# Patient Record
Sex: FEMALE | Race: OTHER
[De-identification: names, ages, dates, MRNs, and addresses within clinical notes are randomized per-mention and may not be internally consistent; named-entity substitution may affect disease eponyms.]

---

## 2019-01-17 ENCOUNTER — HOSPITAL ENCOUNTER (INPATIENT)
Dept: HOSPITAL 74 - JER | Age: 34
LOS: 7 days | Discharge: ANOTHER HOSPITAL | DRG: 43 | End: 2019-01-24
Attending: INTERNAL MEDICINE | Admitting: INTERNAL MEDICINE
Payer: COMMERCIAL

## 2019-01-17 VITALS — BODY MASS INDEX: 23.3 KG/M2

## 2019-01-17 DIAGNOSIS — M54.16: ICD-10-CM

## 2019-01-17 DIAGNOSIS — G35: Primary | ICD-10-CM

## 2019-01-17 DIAGNOSIS — D72.829: ICD-10-CM

## 2019-01-17 DIAGNOSIS — H46.9: ICD-10-CM

## 2019-01-17 DIAGNOSIS — G44.209: ICD-10-CM

## 2019-01-17 DIAGNOSIS — G81.94: ICD-10-CM

## 2019-01-17 DIAGNOSIS — I95.1: ICD-10-CM

## 2019-01-17 DIAGNOSIS — R09.89: ICD-10-CM

## 2019-01-17 LAB
ALBUMIN SERPL-MCNC: 3.5 G/DL (ref 3.4–5)
ALP SERPL-CCNC: 49 U/L (ref 45–117)
ALT SERPL-CCNC: 33 U/L (ref 13–61)
ANION GAP SERPL CALC-SCNC: 7 MMOL/L (ref 8–16)
AST SERPL-CCNC: 41 U/L (ref 15–37)
BASOPHILS # BLD: 0.2 % (ref 0–2)
BILIRUB SERPL-MCNC: 0.4 MG/DL (ref 0.2–1)
BUN SERPL-MCNC: 22 MG/DL (ref 7–18)
CALCIUM SERPL-MCNC: 8.5 MG/DL (ref 8.5–10.1)
CHLORIDE SERPL-SCNC: 103 MMOL/L (ref 98–107)
CO2 SERPL-SCNC: 27 MMOL/L (ref 21–32)
CREAT SERPL-MCNC: 0.8 MG/DL (ref 0.55–1.3)
DEPRECATED RDW RBC AUTO: 12.8 % (ref 11.6–15.6)
EOSINOPHIL # BLD: 0 % (ref 0–4.5)
GLUCOSE SERPL-MCNC: 134 MG/DL (ref 74–106)
HCT VFR BLD CALC: 37.3 % (ref 32.4–45.2)
HGB BLD-MCNC: 12.8 GM/DL (ref 10.7–15.3)
LYMPHOCYTES # BLD: 17 % (ref 8–40)
MCH RBC QN AUTO: 31.1 PG (ref 25.7–33.7)
MCHC RBC AUTO-ENTMCNC: 34.2 G/DL (ref 32–36)
MCV RBC: 91.1 FL (ref 80–96)
MONOCYTES # BLD AUTO: 5.2 % (ref 3.8–10.2)
NEUTROPHILS # BLD: 77.6 % (ref 42.8–82.8)
PLATELET # BLD AUTO: 247 K/MM3 (ref 134–434)
PMV BLD: 9.6 FL (ref 7.5–11.1)
POTASSIUM SERPLBLD-SCNC: 5 MMOL/L (ref 3.5–5.1)
PROT SERPL-MCNC: 7.5 G/DL (ref 6.4–8.2)
RBC # BLD AUTO: 4.1 M/MM3 (ref 3.6–5.2)
SODIUM SERPL-SCNC: 137 MMOL/L (ref 136–145)
WBC # BLD AUTO: 11 K/MM3 (ref 4–10)

## 2019-01-17 PROCEDURE — G0378 HOSPITAL OBSERVATION PER HR: HCPCS

## 2019-01-17 RX ADMIN — PANTOPRAZOLE SODIUM SCH MG: 40 INJECTION, POWDER, FOR SOLUTION INTRAVENOUS at 20:04

## 2019-01-17 NOTE — PN
Teaching Attending Note


Name of Resident: Jose E Amor





ATTENDING PHYSICIAN STATEMENT





I saw and evaluated the patient.


I reviewed the resident's note and discussed the case with the resident.


I agree with the resident's findings and plan as documented.








SUBJECTIVE: This is a 33 year old woman with a history of MS who comes to the 

ED complaining of blurred vision, headaches, and left leg weakness x 2 weeks. 

She also reports pain with some movements of her left eye. She was recently 

admitted at [facility] and treated with IV steroids. She was discharged home 

on oral steroids. She says that she was not doing better at discharge. Today, 

Dr. Bridges advised her to be admitted since her symptoms still had not 

improved. 








OBJECTIVE:


 Vital Signs











 Period  Temp  Pulse  Resp  BP Sys/James  Pulse Ox


 


 Last 24 Hr  98.4 F  77  18  137/80  100








HEART: S1S2, RRR


LUNGS: Clear


ABDOMEN: Soft, non-tender, non-distended, normal BS


EXTREMITIES: No edema


NEUROLOGICAL: Alert, oriented. Pupils equal, round and reactive but left 

sluggish compared to right. Normal speech. Sensation intact. Strength 5/5 in LUE

/RUE/RLE and 4/5 in LLE. DTRs symmetric.





 Laboratory Tests











  01/17/19 01/17/19 01/17/19





  18:40 18:40 18:40


 


WBC  11.0 H  


 


RBC  4.10  


 


Hgb  12.8  


 


Hct  37.3  


 


MCV  91.1  


 


MCH  31.1  


 


MCHC  34.2  


 


RDW  12.8  


 


Plt Count  247  


 


MPV  9.6  


 


Absolute Neuts (auto)  8.5 H  


 


Neutrophils %  77.6  


 


Lymphocytes %  17.0  


 


Monocytes %  5.2  


 


Eosinophils %  0.0  


 


Basophils %  0.2  


 


Nucleated RBC %  0  


 


Sodium   137 


 


Potassium   5.0 


 


Chloride   103 


 


Carbon Dioxide   27 


 


Anion Gap   7 L 


 


BUN   22 H 


 


Creatinine   0.8 


 


Creat Clearance w eGFR   > 60 


 


Random Glucose   134 H 


 


Calcium   8.5 


 


Total Bilirubin   0.4 


 


AST   41 H 


 


ALT   33 


 


Alkaline Phosphatase   49 


 


Total Protein   7.5 


 


Albumin   3.5 


 


Serum Pregnancy, Qual    Negative








 Home Medications











 Medication  Instructions  Recorded


 


NK [No Known Home Medication]  01/17/19














ASSESSMENT AND PLAN:


This is a 33 year old woman with a history of MS who presented to the ED with 

blurred vision, headaches, and left leg weakness x 2 weeks.





1. Acute exacerbation of multiple sclerosis with optic neuritis


   - SoluMedrol 250 mg IV q6h


   - MRI of brain, C-spine


   - Neurology consult


2. Leukocytosis


   - Likely steroid-induced


   - No evidence of infection

## 2019-01-17 NOTE — PDOC
Attending Attestation





- Resident


Resident Name: MikeyAlex elamel





- ED Attending Attestation


I have performed the following: I have examined & evaluated the patient, The 

case was reviewed & discussed with the resident, I agree w/resident's findings 

& plan, Exceptions are as noted





- Medical Decision Making





01/17/19 18:47








I, Dr. Laura Palumbo, DO, attest that this document has been prepared under 

my direction and personally reviewed by me in its entirety.   I further attest, 

that it accurately reflects all work, treatment, procedures and medical decision

-making performed by me.  


01/17/19 19:32


a/p: 34yo female with hx of optic neuritis and MS who follows with Dr. Bridges 

sent for IV steroids for a MS flair


-L eye pain,  photophobia


-L leg weakness


-had recent hospitalization at [place] who 3 days of steroids, but didn't 

improve


-did not have MRI brain at [place]


-labs, ekg, steroids ordered by E


-will admit to [facility] - PMD in [place]


-Consult to Dr. Bridges


MRI brain without contrast ordered


01/17/19 19:49


resident discussed the case with [facility] who accepts the patient to serivce





<Laura Palumbo - Last Filed: 01/17/19 19:49>





- HPI


HPI: 





01/17/19 21:42


Patient is a 33 year old female with a significant past medical history of M.S, 

who presents to the ED with complaints of eye pain that began x2 weeks ago. 

Patient reports experiencing graudal left eye pain with associated blurred 

vision that has been constant for 2 weeks. She reports being seen and treated 

at [facility] but was discharged without her symptoms being relieved. 

Patient reports coming into the ED for further evaluation after symptoms failed 

to subside over time. 





Denies chest pain, Sob. Denies nausea, vomiting. Denies fevers, chills. Denies 

dysuria, hematuria. Denies constipation, diarrhea. Denies trauma to affected 

area, loss of consciousness. Denies contact with sick individuals, out of state 

travelling. Denies any other symptoms. 





Allergies: None


Social history: No smoking. No alcohol. No illicit drugs. 


Surgical: None


PMD: None


Neurologist: Dr. Bridges








- Physicial Exam


PE: 





01/17/19 21:42


GENERAL: Awake, alert, and fully oriented, in no acute distress


HEAD: No signs of trauma


EYES: +No afferent pupillary defect.. 


PERRLA, EOMI, sclera anicteric, conjunctiva clear


ENT: Auricles normal inspection, hearing grossly normal, nares patent, 

oropharynx clear without exudates. Moist mucosa


NECK: Normal ROM, supple, no lymphadenopathy, JVD, or masses


LUNGS: Breath sounds equal, clear to auscultation bilaterally.  No wheezes, and 

no crackles


HEART: Regular rate and rhythm, normal S1 and S2, no murmurs, rubs or gallops


ABDOMEN: Soft, nontender, normoactive bowel sounds.  No guarding, no rebound.  

No masses


EXTREMITIES: +Left leg weakness. Upper extremity strength 5/5. +Left leg 

flexion 4/5, plantar 4/ 5, dorsal 4/5


Normal range of motion, no edema.  No clubbing or cyanosis. No cords, erythema, 

or tenderness


NEUROLOGICAL: Cranial nerves II through XII grossly intact.  Normal speech, 

normal gait


SKIN: Warm, Dry, normal turgor, no rashes or lesions noted.








<Daniel Sharma - Last Filed: 01/17/19 21:43>

## 2019-01-17 NOTE — PDOC
History of Present Illness





- General


Chief Complaint: Eye Problem


Stated Complaint: PCP ADMIT


Time Seen by Provider: 01/17/19 18:11


History Source: Patient


Exam Limitations: No Limitations





- History of Present Illness


Initial Comments: 


Patient is a 32 y/o F w/ PMHx RRMS on Avonex p/w eye pain and blurry vision x 2 

weeks. Per patient was seen and treated at [place] but was discharged without 

resolution of symptoms. Her regular neurologist is Dr. Bridges. Continues to 

complain of eye pain and blurry vision as well as slightly worsening weakness 

of the LLE. ROS otherwise negative.


01/17/19 19:15








Past History





- Travel


Traveled outside of the country in the last 30 days: No


Close contact w/someone who was outside of country & ill: No





- Past Medical History


Allergies/Adverse Reactions: 


 Allergies











Allergy/AdvReac Type Severity Reaction Status Date / Time


 


No Known Allergies Allergy   Verified 01/17/19 19:07











COPD: No


Dialysis: No


Liver Disease: No


Other medical history: MS





- Immunization History


Immunization Up to Date: No





- Suicide/Smoking/Psychosocial Hx


Smoking History: Never smoked


Have you smoked in the past 12 months: No


Information on smoking cessation initiated: No


Hx Alcohol Use: No


Drug/Substance Use Hx: No


Substance Use Type: Alcohol





**Review of Systems





- Review of Systems


Comments:: 


As per HPI


01/17/19 19:17








*Physical Exam





- Vital Signs


 Last Vital Signs











Temp Pulse Resp BP Pulse Ox


 


 98.4 F   77   18   137/80   100 


 


 01/17/19 18:12  01/17/19 18:12  01/17/19 18:12  01/17/19 18:12  01/17/19 18:12














- Physical Exam


Comments: 


Gen: A&Ox3, NAD


HEENT: EOMI, +APD on left, MMM


CV: RRR no m/r/g


Resp: CTA b/l


Abd: +bs, soft, NT, ND


Extremities: 2+ pulses, wwp, no edema


Neuro: CN II as per HEENT, CN III-XII intact b/l, motor strength 5-/5 in LLE 

otherwise 5/5 throughout, FtN intact b/l, no spasticity, no sensory deficit


Psych: normal mood, normal affect


Skin: warm, dry, normal turgor


01/17/19 19:17








Moderate Sedation





- Procedure Monitoring


Vital Signs: 


Procedure Monitoring Vital Signs











Temperature  98.4 F   01/17/19 18:12


 


Pulse Rate  77   01/17/19 18:12


 


Respiratory Rate  18   01/17/19 18:12


 


Blood Pressure  137/80   01/17/19 18:12


 


O2 Sat by Pulse Oximetry (%)  100   01/17/19 18:12











ED Treatment Course





- LABORATORY


CBC & Chemistry Diagram: 


 01/17/19 18:40





 01/17/19 18:40





- ADDITIONAL ORDERS


Additional order review: 


 Laboratory  Results











  01/17/19





  18:40


 


Serum Pregnancy, Qual  Negative








 











  01/17/19





  18:40


 


RBC  4.10


 


MCV  91.1


 


MCHC  34.2


 


RDW  12.8


 


MPV  9.6


 


Neutrophils %  77.6


 


Lymphocytes %  17.0


 


Monocytes %  5.2


 


Eosinophils %  0.0


 


Basophils %  0.2














- RADIOLOGY


Radiology Studies Ordered: 














 Category Date Time Status


 


 BRAIN MRI W&W/O CONTRAST [MRI] Stat MRI  01/17/19 19:06 Ordered














- Medications


Given in the ED: 


ED Medications














Discontinued Medications














Generic Name Dose Route Start Last Admin





  Trade Name Freq  PRN Reason Stop Dose Admin


 


Methylprednisolone Sodium Succinate  250 mg  01/17/19 18:12  01/17/19 19:06





  Solu-Medrol -  IVPUSH  01/17/19 18:13  250 mg





  ONCE ONE   Administration





     





     





     





     














Medical Decision Making





- Medical Decision Making


Patient p/w acute exacerbation of MS. Dr. Bridges is aware. Consult placed. 

Solumedrol 250q6h per neuro instructions. Brain MRI ordered.


01/17/19 19:20








*DC/Admit/Observation/Transfer


Diagnosis at time of Disposition: 


 Exacerbation of multiple sclerosis








- Discharge Dispostion


Condition at time of disposition: Stable


Decision to Admit order: Yes





- Referrals





- Patient Instructions





- Post Discharge Activity

## 2019-01-17 NOTE — HP
CHIEF COMPLAINT: Blurred vision, LLE weakness





PCP: 





HISTORY OF PRESENT ILLNESS:


The patient is a 32 yo f w/ PMH MS who comes into the ED c/o a 2 week hx of 

blurred vision, headaches and LLE weakness. The patient sought medical 

attention at [facility] and was admitted there for treatment of these complaints. 

Per the patient, she was treated with 3 doses (amount unknown) of IV steroids 

and discharged home with a large amount of PO steroids and instructed to follow 

up. The patient's symptoms had not resolved on discharge. Today, the patient 

saw her neurologist, Dr. Bridges, who referred her for admission upon hearing 

that her symptoms were not improving. Patient also complains of headaches 2/2 

to the blurred vision as well as LLE weakness for the same period of time. 

Patient denies loss of sensation or paralysis. Patient denies slurred speech or 

facial droop. Patient denies Chest pain, SOB, fevers, chills, dysuria. 





ER course was notable for:


(1) Dr. Bridges consulted from the ER; suggests 250mg medrol Q6h and 40mg IV 

protonix daily as well as MRI of brain, and cervical, thoracic and lumbar spine 

w/ w/o contrast. 


(2) Labs unremarkable 





Recent Travel:


none





PAST MEDICAL HISTORY:


see hpi





PAST SURGICAL HISTORY:


none





Social History:


Smoking: never


Alcohol: denies


Drugs: denies





Family History:


Allergies





No Known Allergies Allergy (Verified 01/17/19 19:07)


 








HOME MEDICATIONS:


REVIEW OF SYSTEMS


CONSTITUTIONAL: 


Absent:  fever, chills, diaphoresis, generalized weakness, malaise, loss of 

appetite, weight change


HEENT: 


Absent:  rhinorrhea, nasal congestion, throat pain, throat swelling, difficulty 

swallowing, mouth swelling, ear pain


CARDIOVASCULAR: 


Absent: chest pain, syncope, palpitations, irregular heart rate, lightheadedness

, peripheral edema


RESPIRATORY: 


Absent: cough, shortness of breath, dyspnea with exertion, orthopnea, wheezing, 

stridor, hemoptysis


GASTROINTESTINAL:


Absent: abdominal pain, abdominal distension, nausea, vomiting, diarrhea, 

constipation, melena, hematochezia


GENITOURINARY: 


Absent: dysuria, frequency, urgency, hesitancy, hematuria, flank pain, genital 

pain


MUSCULOSKELETAL: 


Absent: myalgia, arthralgia, joint swelling, back pain, neck pain


SKIN: 


Absent: rash, itching, pallor


HEMATOLOGIC/IMMUNOLOGIC: 


Absent: easy bleeding, easy bruising, lymphadenopathy, frequent infections


ENDOCRINE:


Absent: unexplained weight gain, unexplained weight loss, heat intolerance, 

cold intolerance


NEUROLOGIC: 


Absent: dizziness, unsteady gait, seizure, mental status changes, bladder or 

bowel incontinence


PSYCHIATRIC: 


Absent: anxiety, depression, suicidal or homicidal ideation, hallucinations.








PHYSICAL EXAMINATION


 Vital Signs - 24 hr











  01/17/19





  18:12


 


Temperature 98.4 F


 


Pulse Rate 77


 


Respiratory 18





Rate 


 


Blood Pressure 137/80


 


O2 Sat by Pulse 100





Oximetry (%) 











GENERAL: Awake, alert, and fully oriented, in no acute distress.


HEAD: Normal with no signs of trauma.


EYES: Pupils equal, round. Left pupil sluggish in response to light. Right 

pupil with brisk response to light. Consensual pupillary reaction intact.  

extraocular movements intact. Patient endorses left eye pain upon asked to look 

up and to the right. sclera anicteric, conjunctiva clear. No lid lag.


LUNGS: Breath sounds equal, clear to auscultation bilaterally. No wheezes, and 

no crackles. No accessory muscle use.


HEART: Regular rate and rhythm, normal S1 and S2 without murmur, rub or gallop.


ABDOMEN: Soft, nontender, not distended, normoactive bowel sounds, no guarding, 

no rebound, no masses.  No hepatomegaly or  splenomegaly. 


LOWER EXTREMITIES: 2+ pulses, warm, well-perfused. No calf tenderness. No 

peripheral edema. 


NEUROLOGICAL:  Cranial nerves II-X intact except for eyes noted above. Normal 

speech. Strength 5/5 in both upper extremities, 5/5 in the right lower extremity

, 4/5 in left lower extremity. 


SKIN: Warm, dry, normal turgor, no rashes or lesions noted, normal capillary 

refill. 





 Laboratory Results - last 24 hr











  01/17/19 01/17/19 01/17/19





  18:40 18:40 18:40


 


WBC  11.0 H  


 


RBC  4.10  


 


Hgb  12.8  


 


Hct  37.3  


 


MCV  91.1  


 


MCH  31.1  


 


MCHC  34.2  


 


RDW  12.8  


 


Plt Count  247  


 


MPV  9.6  


 


Absolute Neuts (auto)  8.5 H  


 


Neutrophils %  77.6  


 


Lymphocytes %  17.0  


 


Monocytes %  5.2  


 


Eosinophils %  0.0  


 


Basophils %  0.2  


 


Nucleated RBC %  0  


 


Sodium   137 


 


Potassium   5.0 


 


Chloride   103 


 


Carbon Dioxide   27 


 


Anion Gap   7 L 


 


BUN   22 H 


 


Creatinine   0.8 


 


Creat Clearance w eGFR   > 60 


 


Random Glucose   134 H 


 


Calcium   8.5 


 


Total Bilirubin   0.4 


 


AST   41 H 


 


ALT   33 


 


Alkaline Phosphatase   49 


 


Total Protein   7.5 


 


Albumin   3.5 


 


Serum Pregnancy, Qual    Negative











ASSESSMENT/PLAN:


The patient is a 32 yo f w/ PMH MS who comes into the ED c/o persistent blurred 

vision, headache and LLE weakness. 





#blurred vision and weakness 2/2 to acute exacerbation of MS


   -Dr. Bridges consulted from ER; suggested 250mg medrol IV, 40 mg protonix IV 

daily and MRI of the brain and spine


   -will treat patient according to neurology's instructions 





#FEN


   -no fluids indicated


   -lytes wnl


   -regular diet





#Prophy


   -patient young and ambulatory


   -SCDs, early ambulation





#Dispo


   -admit inpatient med-surg





Visit type





- Emergency Visit


Emergency Visit: Yes


ED Registration Date: 01/17/19


Care time: The patient presented to the Emergency Department on the above date 

and was hospitalized for further evaluation of their emergent condition.





- New Patient


This patient is new to me today: Yes


Date on this admission: 01/17/19





- Critical Care


Critical Care patient: No

## 2019-01-17 NOTE — PDOC
Rapid Medical Evaluation


Time Seen by Provider: 01/17/19 18:11


Medical Evaluation: 


 Allergies











Allergy/AdvReac Type Severity Reaction Status Date / Time


 


No Known Allergies Allergy   Verified 12/10/15 11:00











01/17/19 18:11


I have performed a brief in-person evaluation of this patient.


The patient presents with a chief complaint of: MS exacerbation, sent by Dr. Egan for admission. Pt has rx with further instructions.


Pertinent physical exam findings: Alert, oriented, no distress.


I have ordered the following: EKG, CXR, labs, Solu-Medrol 250mg IVP.


The patient will proceed to the ED for further evaluation.





**Discharge Disposition





- Diagnosis


 Exacerbation of multiple sclerosis








- Referrals





- Patient Instructions





- Post Discharge Activity

## 2019-01-18 LAB
ANION GAP SERPL CALC-SCNC: 7 MMOL/L (ref 8–16)
APTT BLD: 28.3 SECONDS (ref 25.2–36.5)
BUN SERPL-MCNC: 21 MG/DL (ref 7–18)
CALCIUM SERPL-MCNC: 8.3 MG/DL (ref 8.5–10.1)
CHLORIDE SERPL-SCNC: 104 MMOL/L (ref 98–107)
CO2 SERPL-SCNC: 27 MMOL/L (ref 21–32)
CREAT SERPL-MCNC: 0.8 MG/DL (ref 0.55–1.3)
DEPRECATED RDW RBC AUTO: 12.4 % (ref 11.6–15.6)
GLUCOSE SERPL-MCNC: 128 MG/DL (ref 74–106)
HCT VFR BLD CALC: 37.5 % (ref 32.4–45.2)
HGB BLD-MCNC: 12.8 GM/DL (ref 10.7–15.3)
INR BLD: 0.98 (ref 0.83–1.09)
MAGNESIUM SERPL-MCNC: 1.9 MG/DL (ref 1.8–2.4)
MCH RBC QN AUTO: 31.3 PG (ref 25.7–33.7)
MCHC RBC AUTO-ENTMCNC: 34 G/DL (ref 32–36)
MCV RBC: 92.2 FL (ref 80–96)
PHOSPHATE SERPL-MCNC: 3.2 MG/DL (ref 2.5–4.9)
PLATELET # BLD AUTO: 208 K/MM3 (ref 134–434)
PMV BLD: 9.1 FL (ref 7.5–11.1)
POTASSIUM SERPLBLD-SCNC: 4.7 MMOL/L (ref 3.5–5.1)
PT PNL PPP: 11.6 SEC (ref 9.7–13)
RBC # BLD AUTO: 4.07 M/MM3 (ref 3.6–5.2)
SODIUM SERPL-SCNC: 138 MMOL/L (ref 136–145)
WBC # BLD AUTO: 8.6 K/MM3 (ref 4–10)

## 2019-01-18 RX ADMIN — PANTOPRAZOLE SODIUM SCH MG: 40 INJECTION, POWDER, FOR SOLUTION INTRAVENOUS at 09:30

## 2019-01-18 RX ADMIN — ACETAMINOPHEN PRN MG: 325 TABLET ORAL at 09:47

## 2019-01-18 RX ADMIN — INSULIN ASPART SCH: 100 INJECTION, SOLUTION INTRAVENOUS; SUBCUTANEOUS at 11:40

## 2019-01-18 RX ADMIN — DULOXETINE SCH MG: 20 CAPSULE, DELAYED RELEASE ORAL at 10:30

## 2019-01-18 RX ADMIN — INSULIN ASPART SCH: 100 INJECTION, SOLUTION INTRAVENOUS; SUBCUTANEOUS at 18:20

## 2019-01-18 RX ADMIN — BUTALBITAL, ACETAMINOPHEN, AND CAFFEINE PRN TABLET: 50; 325; 40 TABLET ORAL at 10:29

## 2019-01-18 RX ADMIN — ACETAMINOPHEN PRN MG: 325 TABLET ORAL at 00:03

## 2019-01-18 RX ADMIN — METHYLPREDNISOLONE SODIUM SUCCINATE SCH MG: 125 INJECTION, POWDER, FOR SOLUTION INTRAMUSCULAR; INTRAVENOUS at 21:59

## 2019-01-18 RX ADMIN — INSULIN ASPART SCH: 100 INJECTION, SOLUTION INTRAVENOUS; SUBCUTANEOUS at 22:11

## 2019-01-18 RX ADMIN — METHYLPREDNISOLONE SODIUM SUCCINATE SCH MG: 125 INJECTION, POWDER, FOR SOLUTION INTRAMUSCULAR; INTRAVENOUS at 00:19

## 2019-01-18 RX ADMIN — METHYLPREDNISOLONE SODIUM SUCCINATE SCH MG: 125 INJECTION, POWDER, FOR SOLUTION INTRAMUSCULAR; INTRAVENOUS at 07:50

## 2019-01-18 RX ADMIN — METHYLPREDNISOLONE SODIUM SUCCINATE SCH MG: 125 INJECTION, POWDER, FOR SOLUTION INTRAMUSCULAR; INTRAVENOUS at 13:10

## 2019-01-18 RX ADMIN — HEPARIN SODIUM SCH: 5000 INJECTION, SOLUTION INTRAVENOUS; SUBCUTANEOUS at 23:08

## 2019-01-18 NOTE — PN
Physical Exam: 


SUBJECTIVE: Patient seen and examined this AM. She states her blurred vision 

has slightly improved though not much, and that she is still having weakness in 

her left side specifically her left leg. 








OBJECTIVE:





 Vital Signs











 Period  Temp  Pulse  Resp  BP Sys/James  Pulse Ox


 


 Last 24 Hr  98.1 F-98.4 F  58-78  18-18  128-148/67-92  











GENERAL: A&O, no acute distress


EYES: No visual field loss, though blurred vision in the left eye. Left 

pupillary afferent defect.


EARS, NOSE, THROAT: oropharynx clear without exudates. Moist mucous membranes.


LUNGS: CTA b/l, no crackles or wheezes


HEART: Regular rate and rhythm, normal S1 and S2 without murmur


ABDOMEN: Soft, nontender to palpation, normoactive bowel sounds


EXTREMITIES: warm, well-perfused. No peripheral edema. 


NEUROLOGICAL:  Cranial nerves II-XII intact. Normal speech. 4/5 weakness in LLE 


PSYCHIATRIC: Cooperative. Good eye contact. Appropriate mood and affect.

















 Laboratory Results - last 24 hr











  01/17/19 01/17/19 01/17/19





  18:40 18:40 18:40


 


WBC  11.0 H  


 


RBC  4.10  


 


Hgb  12.8  


 


Hct  37.3  


 


MCV  91.1  


 


MCH  31.1  


 


MCHC  34.2  


 


RDW  12.8  


 


Plt Count  247  


 


MPV  9.6  


 


Absolute Neuts (auto)  8.5 H  


 


Neutrophils %  77.6  


 


Lymphocytes %  17.0  


 


Monocytes %  5.2  


 


Eosinophils %  0.0  


 


Basophils %  0.2  


 


Nucleated RBC %  0  


 


PT with INR   


 


INR   


 


PTT (Actin FS)   


 


Sodium   137 


 


Potassium   5.0 


 


Chloride   103 


 


Carbon Dioxide   27 


 


Anion Gap   7 L 


 


BUN   22 H 


 


Creatinine   0.8 


 


Creat Clearance w eGFR   > 60 


 


POC Glucometer   


 


Random Glucose   134 H 


 


Calcium   8.5 


 


Phosphorus   


 


Magnesium   


 


Total Bilirubin   0.4 


 


AST   41 H 


 


ALT   33 


 


Alkaline Phosphatase   49 


 


Total Protein   7.5 


 


Albumin   3.5 


 


Serum Pregnancy, Qual    Negative














  01/18/19 01/18/19 01/18/19





  05:20 05:20 05:20


 


WBC  8.6  


 


RBC  4.07  


 


Hgb  12.8  


 


Hct  37.5  


 


MCV  92.2  


 


MCH  31.3  


 


MCHC  34.0  


 


RDW  12.4  


 


Plt Count  208  


 


MPV  9.1  


 


Absolute Neuts (auto)   


 


Neutrophils %   


 


Lymphocytes %   


 


Monocytes %   


 


Eosinophils %   


 


Basophils %   


 


Nucleated RBC %   


 


PT with INR   11.60 


 


INR   0.98 


 


PTT (Actin FS)   28.3 


 


Sodium    138


 


Potassium    4.7


 


Chloride    104


 


Carbon Dioxide    27


 


Anion Gap    7 L


 


BUN    21 H


 


Creatinine    0.8


 


Creat Clearance w eGFR    > 60


 


POC Glucometer   


 


Random Glucose    128 H


 


Calcium    8.3 L


 


Phosphorus    3.2


 


Magnesium    1.9


 


Total Bilirubin   


 


AST   


 


ALT   


 


Alkaline Phosphatase   


 


Total Protein   


 


Albumin   


 


Serum Pregnancy, Qual   














  01/18/19





  11:35


 


WBC 


 


RBC 


 


Hgb 


 


Hct 


 


MCV 


 


MCH 


 


MCHC 


 


RDW 


 


Plt Count 


 


MPV 


 


Absolute Neuts (auto) 


 


Neutrophils % 


 


Lymphocytes % 


 


Monocytes % 


 


Eosinophils % 


 


Basophils % 


 


Nucleated RBC % 


 


PT with INR 


 


INR 


 


PTT (Actin FS) 


 


Sodium 


 


Potassium 


 


Chloride 


 


Carbon Dioxide 


 


Anion Gap 


 


BUN 


 


Creatinine 


 


Creat Clearance w eGFR 


 


POC Glucometer  102.84821


 


Random Glucose 


 


Calcium 


 


Phosphorus 


 


Magnesium 


 


Total Bilirubin 


 


AST 


 


ALT 


 


Alkaline Phosphatase 


 


Total Protein 


 


Albumin 


 


Serum Pregnancy, Qual 








Active Medications











Generic Name Dose Route Start Last Admin





  Trade Name Freq  PRN Reason Stop Dose Admin


 


Acetaminophen  650 mg  01/17/19 23:49  01/18/19 09:47





  Tylenol -  PO   650 mg





  Q6H PRN   Administration





  PAIN LEVEL 1-5   





     





     





     


 


Acetaminophen/Butalbital/Caffeine  1 tablet  01/18/19 09:27  01/18/19 10:29





  Fioricet -  PO   1 tablet





  DAILY PRN   Administration





  HEADACHE   





     





     





     


 


Duloxetine HCl  20 mg  01/18/19 10:00  01/18/19 10:30





  Cymbalta -  PO   20 mg





  DAILY ZAYNAB   Administration





     





     





     





     


 


Insulin Aspart  1 vial  01/18/19 11:00  01/18/19 11:40





  Novolog Vial Sliding Scale -  SQ   Not Given





  ACHS ZAYNAB   





     





     





  Protocol   





     


 


Methylprednisolone Sodium Succinate  250 mg  01/18/19 01:00  01/18/19 13:10





  Solu-Medrol -  IVPUSH   250 mg





  Q6H ZAYNAB   Administration





     





     





     





     


 


Pantoprazole Sodium  40 mg  01/17/19 19:45  01/18/19 09:30





  Protonix Iv  IVPUSH   40 mg





  DAILY ZAYNAB   Administration





     





     





     





     











ASSESSMENT/PLAN:


34 yo f w/ PMH MS who comes into the ED c/o a 2 week hx of blurred vision, 

headaches and LLE weakness.








Acute Exacerbation of Multiple Sclerosis with Optic Neuritis


-Neurology consult appreciated and case discussed


-SoluMedrol 250 mg IV Q6


-BGMs ACHS with Insulin sliding scale for glycemic control due to high dose 

steroids


-To receive 3 days of steroid and reevaluate, can d/c Sunday if resolved, if 

not continue until Tuesday


-Can receive Fioricet PRN for Headache





Back and left leg pain, likely Lumbar radiculopathy


-MRI lumbar spine pending


-Cymbalta 20 mg PO Daily, can increase to 30 as per neurology if ineffective





DVT Prophylaxis


-Lovenox 40 mg SQ Daily





FEN


-Fluids: none


-Electrolytes: No electrolyte abnormalities, BMP in AM


-Nutrition: Regular Diet





Disposition


Med/Surg





Visit type





- Emergency Visit


Emergency Visit: Yes


ED Registration Date: 01/18/19


Care time: The patient presented to the Emergency Department on the above date 

and was hospitalized for further evaluation of their emergent condition.





- New Patient


This patient is new to me today: Yes


Date on this admission: 01/18/19





- Critical Care


Critical Care patient: No

## 2019-01-18 NOTE — CONSULT
Consult - text type





- Consultation


Consultation Note: 





  Neurology


CHIEF COMPLAINT: Blurred vision, LLE weakness








HISTORY OF PRESENT ILLNESS:


The patient is a 34 yo f w/ PMH MS who comes into the ED c/o a 2 week hx of 

blurred vision, headaches and LLE weakness. The patient sought medical 

attention at [facility] and was admitted there for treatment of these complaints. 

She was treated with 1g of IV solumedrol for three days and discharged home 

with a large amount of PO steroids and instructed to follow up. The patient's 

symptoms had not resolved on discharge and therefore saw me in the office on 1/ 18 and referred her for admission upon hearing that her symptoms persisted. 

Imaging was not done at [facility]. Patient also complains of headaches 2/2 to 

the blurred vision as well as LLE weakness along with radicular pain. Patient 

denies loss of sensation or paralysis. Patient denies slurred speech or facial 

droop. Completed MRI brain and C spine overnight, reviewed, awaiting official 

read. 








Recent Travel:


none





PAST MEDICAL HISTORY:


see hpi





PAST SURGICAL HISTORY:


none





Social History:


Smoking: never


Alcohol: denies


Drugs: denies





Family History:


Allergies





No Known Allergies Allergy (Verified 01/17/19 19:07)


 








HOME MEDICATIONS:


REVIEW OF SYSTEMS


CONSTITUTIONAL: 


Absent:  fever, chills, diaphoresis, generalized weakness, malaise, loss of 

appetite, weight change


HEENT: 


Absent:  rhinorrhea, nasal congestion, throat pain, throat swelling, difficulty 

swallowing, mouth swelling, ear pain


CARDIOVASCULAR: 


Absent: chest pain, syncope, palpitations, irregular heart rate, lightheadedness

, peripheral edema


RESPIRATORY: 


Absent: cough, shortness of breath, dyspnea with exertion, orthopnea, wheezing, 

stridor, hemoptysis


GASTROINTESTINAL:


Absent: abdominal pain, abdominal distension, nausea, vomiting, diarrhea, 

constipation, melena, hematochezia


GENITOURINARY: 


Absent: dysuria, frequency, urgency, hesitancy, hematuria, flank pain, genital 

pain


MUSCULOSKELETAL: 


Absent: myalgia, arthralgia, joint swelling, back pain, neck pain


SKIN: 


Absent: rash, itching, pallor


HEMATOLOGIC/IMMUNOLOGIC: 


Absent: easy bleeding, easy bruising, lymphadenopathy, frequent infections


ENDOCRINE:


Absent: unexplained weight gain, unexplained weight loss, heat intolerance, 

cold intolerance


NEUROLOGIC: 


Absent: dizziness, unsteady gait, seizure, mental status changes, bladder or 

bowel incontinence


PSYCHIATRIC: 


Absent: anxiety, depression, suicidal or homicidal ideation, hallucinations.








PHYSICAL EXAMINATION


  Vital Signs











 Period  Temp  Pulse  Resp  BP Sys/James  Pulse Ox


 


 Last 24 Hr  98.1 F-98.4 F  58-78  18-18  128-137/67-92  














GENERAL: Awake, alert, and fully oriented, in no acute distress.


HEAD: Normal with no signs of trauma.


EYES: Pupils equal, round. Left pupil sluggish in response to light. Right 

pupil with brisk response to light. Consensual pupillary reaction intact.  

extraocular movements intact. Patient endorses left eye pain upon asked to look 

up and to the right. sclera anicteric, conjunctiva clear. No lid lag.


LUNGS: Breath sounds equal, clear to auscultation bilaterally. No wheezes, and 

no crackles. No accessory muscle use.


HEART: Regular rate and rhythm, normal S1 and S2 without murmur, rub or gallop.


ABDOMEN: Soft, nontender, not distended, normoactive bowel sounds, no guarding, 

no rebound, no masses.  No hepatomegaly or  splenomegaly. 


LOWER EXTREMITIES: 2+ pulses, warm, well-perfused. No calf tenderness. No 

peripheral edema. 


NEUROLOGICAL:  Cranial nerves II-X intact except for eyes noted above. Normal 

speech. Strength 5/5 in both upper extremities, 5/5 in the right lower extremity

, 5-/5 in left lower extremity. 


SKIN: Warm, dry, normal turgor, no rashes or lesions noted, normal capillary 

refill. 





 Laboratory Results - last 24 hr











  01/17/19 01/17/19 01/17/19





  18:40 18:40 18:40


 


WBC  11.0 H  


 


RBC  4.10  


 


Hgb  12.8  


 


Hct  37.3  


 


MCV  91.1  


 


MCH  31.1  


 


MCHC  34.2  


 


RDW  12.8  


 


Plt Count  247  


 


MPV  9.6  


 


Absolute Neuts (auto)  8.5 H  


 


Neutrophils %  77.6  


 


Lymphocytes %  17.0  


 


Monocytes %  5.2  


 


Eosinophils %  0.0  


 


Basophils %  0.2  


 


Nucleated RBC %  0  


 


Sodium   137 


 


Potassium   5.0 


 


Chloride   103 


 


Carbon Dioxide   27 


 


Anion Gap   7 L 


 


BUN   22 H 


 


Creatinine   0.8 


 


Creat Clearance w eGFR   > 60 


 


Random Glucose   134 H 


 


Calcium   8.5 


 


Total Bilirubin   0.4 


 


AST   41 H 


 


ALT   33 


 


Alkaline Phosphatase   49 


 


Total Protein   7.5 


 


Albumin   3.5 


 


Serum Pregnancy, Qual    Negative











ASSESSMENT/PLAN:


 34 yo f w/ PMH MS who comes into the ED c/o a 2 week hx of blurred vision, 

headaches and LLE weakness. The patient sought medical attention at [facility] 

and was admitted there for treatment of these complaints. She was treated with 

1g of IV solumedrol for three days and discharged home with a large amount of 

PO steroids and instructed to follow up. The patient's symptoms had not 

resolved on discharge and therefore saw me in the office on 1/18 and referred 

her for admission upon hearing that her symptoms persisted. Imaging was not 

done at [facility]. Patient also complains of headaches 2/2 to the blurred vision 

as well as LLE weakness along with radicular pain. Patient denies loss of 

sensation or paralysis. Patient denies slurred speech or facial droop. 

Completed MRI brain and C spine overnight, reviewed, awaiting official read. 

Follow up official read. She can continue solumedrol [id number] IV, would 

completed a 3 day course on Sunday night. If at that point she is improved, can 

consider discharge. However, if continued visual impairment and/or LLE weakness

, please continue for 5 day course (completed Tuesday evening). Awaiting MRI T 

and L spine. PPI, RISS with steroids. Physical therapy to assist with gait and 

ambulating. Fioricet as needed for headache. Can start duloxetine 20mg for 

symptoms of lumbar radiculopathy for now. Can increase to 30mg if this is not 

effective over the weekend. Discussed with resident at bedside in detail.

## 2019-01-18 NOTE — PN
Teaching Attending Note


Name of Resident: Zaki Camacho





ATTENDING PHYSICIAN STATEMENT





I saw and evaluated the patient.


I reviewed the resident's note and discussed the case with the resident.


I agree with the resident's findings and plan as documented.








SUBJECTIVE: Feels slightly improved. Still complains of some blurring of vision 

and pain/weaknes LLE. Headache improving.








OBJECTIVE: Afebrile, Hemodynamically Stable.


 Last Vital Signs











Temp Pulse Resp BP Pulse Ox


 


 97.9 F   78   20   148/92   99 


 


 01/18/19 14:35  01/18/19 14:35  01/18/19 14:35  01/18/19 14:35  01/18/19 09:48











HEART: S1S2, RRR


LUNGS: Clear to auscultation - no crackles/wheeze.


ABDOMEN: Soft, mild epigastric tenderness - no guarding or rebound.


EXTREMITIES: No edema. No calf tenderness.


NEUROLOGICAL: Alert, oriented. Normal speech. Sensation intact. Strength 5/5 in 

LUE/RUE/RLE and 4/5 in LLE. DTRs symmetric.


 


 Laboratory Results - last 24 hr











  01/17/19 01/17/19 01/17/19





  18:40 18:40 18:40


 


WBC  11.0 H  


 


RBC  4.10  


 


Hgb  12.8  


 


Hct  37.3  


 


MCV  91.1  


 


MCH  31.1  


 


MCHC  34.2  


 


RDW  12.8  


 


Plt Count  247  


 


MPV  9.6  


 


Absolute Neuts (auto)  8.5 H  


 


Neutrophils %  77.6  


 


Lymphocytes %  17.0  


 


Monocytes %  5.2  


 


Eosinophils %  0.0  


 


Basophils %  0.2  


 


Nucleated RBC %  0  


 


PT with INR   


 


INR   


 


PTT (Actin FS)   


 


Sodium   137 


 


Potassium   5.0 


 


Chloride   103 


 


Carbon Dioxide   27 


 


Anion Gap   7 L 


 


BUN   22 H 


 


Creatinine   0.8 


 


Creat Clearance w eGFR   > 60 


 


POC Glucometer   


 


Random Glucose   134 H 


 


Calcium   8.5 


 


Phosphorus   


 


Magnesium   


 


Total Bilirubin   0.4 


 


AST   41 H 


 


ALT   33 


 


Alkaline Phosphatase   49 


 


Total Protein   7.5 


 


Albumin   3.5 


 


Serum Pregnancy, Qual    Negative














  01/18/19 01/18/19 01/18/19





  05:20 05:20 05:20


 


WBC  8.6  


 


RBC  4.07  


 


Hgb  12.8  


 


Hct  37.5  


 


MCV  92.2  


 


MCH  31.3  


 


MCHC  34.0  


 


RDW  12.4  


 


Plt Count  208  


 


MPV  9.1  


 


Absolute Neuts (auto)   


 


Neutrophils %   


 


Lymphocytes %   


 


Monocytes %   


 


Eosinophils %   


 


Basophils %   


 


Nucleated RBC %   


 


PT with INR   11.60 


 


INR   0.98 


 


PTT (Actin FS)   28.3 


 


Sodium    138


 


Potassium    4.7


 


Chloride    104


 


Carbon Dioxide    27


 


Anion Gap    7 L


 


BUN    21 H


 


Creatinine    0.8


 


Creat Clearance w eGFR    > 60


 


POC Glucometer   


 


Random Glucose    128 H


 


Calcium    8.3 L


 


Phosphorus    3.2


 


Magnesium    1.9


 


Total Bilirubin   


 


AST   


 


ALT   


 


Alkaline Phosphatase   


 


Total Protein   


 


Albumin   


 


Serum Pregnancy, Qual   














  01/18/19





  11:35


 


WBC 


 


RBC 


 


Hgb 


 


Hct 


 


MCV 


 


MCH 


 


MCHC 


 


RDW 


 


Plt Count 


 


MPV 


 


Absolute Neuts (auto) 


 


Neutrophils % 


 


Lymphocytes % 


 


Monocytes % 


 


Eosinophils % 


 


Basophils % 


 


Nucleated RBC % 


 


PT with INR 


 


INR 


 


PTT (Actin FS) 


 


Sodium 


 


Potassium 


 


Chloride 


 


Carbon Dioxide 


 


Anion Gap 


 


BUN 


 


Creatinine 


 


Creat Clearance w eGFR 


 


POC Glucometer  102.95389


 


Random Glucose 


 


Calcium 


 


Phosphorus 


 


Magnesium 


 


Total Bilirubin 


 


AST 


 


ALT 


 


Alkaline Phosphatase 


 


Total Protein 


 


Albumin 


 


Serum Pregnancy, Qual 








 Current Medications











Generic Name Dose Route Start Last Admin





  Trade Name Freq  PRN Reason Stop Dose Admin


 


Acetaminophen  650 mg  01/17/19 23:49  01/18/19 09:47





  Tylenol -  PO   650 mg





  Q6H PRN   Administration





  PAIN LEVEL 1-5   





     





     





     


 


Acetaminophen/Butalbital/Caffeine  1 tablet  01/18/19 09:27  01/18/19 10:29





  Fioricet -  PO   1 tablet





  DAILY PRN   Administration





  HEADACHE   





     





     





     


 


Duloxetine HCl  20 mg  01/18/19 10:00  01/18/19 10:30





  Cymbalta -  PO   20 mg





  DAILY ZAYNAB   Administration





     





     





     





     


 


Insulin Aspart  1 vial  01/18/19 11:00  01/18/19 11:40





  Novolog Vial Sliding Scale -  SQ   Not Given





  [facility] ZAYNAB   





     





     





  Protocol   





     


 


Methylprednisolone Sodium Succinate  250 mg  01/18/19 01:00  01/18/19 13:10





  Solu-Medrol -  IVPUSH   250 mg





  Q6H ZAYNAB   Administration





     





     





     





     


 


Pantoprazole Sodium  40 mg  01/17/19 19:45  01/18/19 09:30





  Protonix Iv  IVPUSH   40 mg





  DAILY ZAYNAB   Administration





     





     





     





     

















ASSESSMENT AND PLAN:





33 year old female with a history of MS who presented to the ED with blurred 

vision, headaches, and left leg weakness for he past 2 weeks. She was recently 

admitted at [facility] 1 week ago and treated with IV steroids for 3 days 

then discharged on oral steroid. She is currently referred to the ED from 

Neurology office due to persistence of symptoms.





1. Acute exacerbation of multiple sclerosis with optic neuritis


Started on SoluMedrol 250 mg IV q6h


MRI of brain, C/T/L - spine: Multiple areas of demyelinating lesions including 

L pontine lesion supratentorial lesion, intermedullary plaques C4/5/6/7


Neurology, Dr. Bridges, evaluated  - to continue IV Solumedrol for 3-5 days, 

start Cymbalta, and Fioricet for headache.





2. Leukocytosis


Likely steroid induced, resolved.





GI Px - Protonix


DVT Px - Heparin SQ

## 2019-01-19 LAB
ANION GAP SERPL CALC-SCNC: 7 MMOL/L (ref 8–16)
BUN SERPL-MCNC: 18 MG/DL (ref 7–18)
CALCIUM SERPL-MCNC: 8.3 MG/DL (ref 8.5–10.1)
CHLORIDE SERPL-SCNC: 100 MMOL/L (ref 98–107)
CO2 SERPL-SCNC: 28 MMOL/L (ref 21–32)
CREAT SERPL-MCNC: 0.8 MG/DL (ref 0.55–1.3)
DEPRECATED RDW RBC AUTO: 12.4 % (ref 11.6–15.6)
GLUCOSE SERPL-MCNC: 120 MG/DL (ref 74–106)
HCT VFR BLD CALC: 38.6 % (ref 32.4–45.2)
HGB BLD-MCNC: 13.2 GM/DL (ref 10.7–15.3)
MAGNESIUM SERPL-MCNC: 2.1 MG/DL (ref 1.8–2.4)
MCH RBC QN AUTO: 31.4 PG (ref 25.7–33.7)
MCHC RBC AUTO-ENTMCNC: 34.2 G/DL (ref 32–36)
MCV RBC: 91.8 FL (ref 80–96)
PHOSPHATE SERPL-MCNC: 2.9 MG/DL (ref 2.5–4.9)
PLATELET # BLD AUTO: 261 K/MM3 (ref 134–434)
PMV BLD: 9.6 FL (ref 7.5–11.1)
POTASSIUM SERPLBLD-SCNC: 4.3 MMOL/L (ref 3.5–5.1)
RBC # BLD AUTO: 4.2 M/MM3 (ref 3.6–5.2)
SODIUM SERPL-SCNC: 135 MMOL/L (ref 136–145)
WBC # BLD AUTO: 13.6 K/MM3 (ref 4–10)

## 2019-01-19 RX ADMIN — METHYLPREDNISOLONE SODIUM SUCCINATE SCH MG: 125 INJECTION, POWDER, FOR SOLUTION INTRAMUSCULAR; INTRAVENOUS at 03:09

## 2019-01-19 RX ADMIN — HEPARIN SODIUM SCH: 5000 INJECTION, SOLUTION INTRAVENOUS; SUBCUTANEOUS at 21:04

## 2019-01-19 RX ADMIN — METHYLPREDNISOLONE SODIUM SUCCINATE SCH MG: 125 INJECTION, POWDER, FOR SOLUTION INTRAMUSCULAR; INTRAVENOUS at 21:04

## 2019-01-19 RX ADMIN — INSULIN ASPART SCH: 100 INJECTION, SOLUTION INTRAVENOUS; SUBCUTANEOUS at 23:14

## 2019-01-19 RX ADMIN — METHYLPREDNISOLONE SODIUM SUCCINATE SCH MG: 125 INJECTION, POWDER, FOR SOLUTION INTRAMUSCULAR; INTRAVENOUS at 10:57

## 2019-01-19 RX ADMIN — METHYLPREDNISOLONE SODIUM SUCCINATE SCH MG: 125 INJECTION, POWDER, FOR SOLUTION INTRAMUSCULAR; INTRAVENOUS at 14:58

## 2019-01-19 RX ADMIN — HEPARIN SODIUM SCH UNIT: 5000 INJECTION, SOLUTION INTRAVENOUS; SUBCUTANEOUS at 06:42

## 2019-01-19 RX ADMIN — DULOXETINE SCH MG: 20 CAPSULE, DELAYED RELEASE ORAL at 10:58

## 2019-01-19 RX ADMIN — BUTALBITAL, ACETAMINOPHEN, AND CAFFEINE PRN TABLET: 50; 325; 40 TABLET ORAL at 06:41

## 2019-01-19 RX ADMIN — ONDANSETRON PRN MG: 2 INJECTION INTRAMUSCULAR; INTRAVENOUS at 21:04

## 2019-01-19 RX ADMIN — ACETAMINOPHEN PRN MG: 325 TABLET ORAL at 00:45

## 2019-01-19 RX ADMIN — INSULIN ASPART SCH: 100 INJECTION, SOLUTION INTRAVENOUS; SUBCUTANEOUS at 17:27

## 2019-01-19 RX ADMIN — HEPARIN SODIUM SCH: 5000 INJECTION, SOLUTION INTRAVENOUS; SUBCUTANEOUS at 13:34

## 2019-01-19 RX ADMIN — PANTOPRAZOLE SODIUM SCH MG: 40 TABLET, DELAYED RELEASE ORAL at 10:58

## 2019-01-19 RX ADMIN — ACETAMINOPHEN PRN MG: 325 TABLET ORAL at 14:58

## 2019-01-19 RX ADMIN — INSULIN ASPART SCH: 100 INJECTION, SOLUTION INTRAVENOUS; SUBCUTANEOUS at 06:45

## 2019-01-19 RX ADMIN — INSULIN ASPART SCH: 100 INJECTION, SOLUTION INTRAVENOUS; SUBCUTANEOUS at 11:23

## 2019-01-19 NOTE — PN
Teaching Attending Note


Name of Resident: Zaki Camacho





ATTENDING PHYSICIAN STATEMENT





I saw and evaluated the patient.


I reviewed the resident's note and discussed the case with the resident.


I agree with the resident's findings and plan as documented.








SUBJECTIVE: Feels some improvement in blurred vision but still has pain Left 

eye and LLE. Headache improving.








OBJECTIVE: Afebrile, Hemodynamically Stable.


 Last Vital Signs











Temp Pulse Resp BP Pulse Ox


 


 98.2 F   62   20   150/92   99 


 


 01/19/19 06:00  01/19/19 06:00  01/19/19 06:00  01/19/19 06:00  01/19/19 03:00








HEART: S1, S2, RRR


LUNGS: Clear to auscultation - no crackles/wheeze.


ABDOMEN: Soft, non-tender - no guarding or rebound. Bowel Sounds normal.


EXTREMITIES: No edema. No calf tenderness.


NEUROLOGICAL: Alert, oriented. Normal speech. Sensation intact. Strength 5/5 in 

LUE/RUE/RLE/LLE. DTRs symmetric.








 Laboratory Results - last 24 hr











  01/18/19 01/18/19 01/18/19





  11:35 18:17 22:08


 


WBC   


 


RBC   


 


Hgb   


 


Hct   


 


MCV   


 


MCH   


 


MCHC   


 


RDW   


 


Plt Count   


 


MPV   


 


Sodium   


 


Potassium   


 


Chloride   


 


Carbon Dioxide   


 


Anion Gap   


 


BUN   


 


Creatinine   


 


Creat Clearance w eGFR   


 


POC Glucometer  102.09439  162  140


 


Random Glucose   


 


Calcium   


 


Phosphorus   


 


Magnesium   














  01/19/19 01/19/19 01/19/19





  06:35 06:35 06:44


 


WBC  13.6 H  


 


RBC  4.20  


 


Hgb  13.2  


 


Hct  38.6  


 


MCV  91.8  


 


MCH  31.4  


 


MCHC  34.2  


 


RDW  12.4  


 


Plt Count  261  D  


 


MPV  9.6  


 


Sodium   135 L 


 


Potassium   4.3 


 


Chloride   100 


 


Carbon Dioxide   28 


 


Anion Gap   7 L 


 


BUN   18 


 


Creatinine   0.8 


 


Creat Clearance w eGFR   > 60 


 


POC Glucometer    124


 


Random Glucose   120 H 


 


Calcium   8.3 L 


 


Phosphorus   2.9 


 


Magnesium   2.1 








 


 Current Medications











Generic Name Dose Route Start Last Admin





  Trade Name Freq  PRN Reason Stop Dose Admin


 


Acetaminophen  650 mg  01/17/19 23:49  01/19/19 00:45





  Tylenol -  PO   650 mg





  Q6H PRN   Administration





  PAIN LEVEL 1-5   





     





     





     


 


Acetaminophen/Butalbital/Caffeine  1 tablet  01/18/19 09:27  01/19/19 06:41





  Fioricet -  PO   1 tablet





  DAILY PRN   Administration





  HEADACHE   





     





     





     


 


Duloxetine HCl  20 mg  01/18/19 10:00  01/18/19 10:30





  Cymbalta -  PO   20 mg





  DAILY ZAYNAB   Administration





     





     





     





     


 


Heparin Sodium (Porcine)  5,000 unit  01/18/19 22:00  01/19/19 06:42





  Heparin -  SQ   5,000 unit





  TID ZAYNAB   Administration





     





     





     





     


 


Insulin Aspart  1 vial  01/18/19 11:00  01/19/19 06:45





  Novolog Vial Sliding Scale -  SQ   Not Given





  [facility]   





     





     





  Protocol   





     


 


Methylprednisolone Sodium Succinate  250 mg  01/18/19 21:00  01/19/19 03:09





  Solu-Medrol -  IVPB   250 mg





  Q6H-IV ZAYNAB   Administration





     





     





     





     


 


Pantoprazole Sodium  40 mg  01/19/19 10:00  





  Protonix -  PO   





  DAILY ZAYNAB   





     





     





     





     











 











ASSESSMENT AND PLAN:





33 year old female with a history of MS who presented to the ED with blurred 

vision, headaches, and left leg pain/weakness for he past 2 weeks. She was 

recently admitted at [facility] 1 week ago and treated with IV steroids for 3 

days then discharged on oral steroid. She is currently referred to the ED from 

Neurologist's office due to persistence of symptoms.





1. Acute exacerbation of multiple sclerosis with optic neuritis and LLE pain/

weakness


Continue SoluMedrol 250 mg IV q6h


MRI of brain, C Spine - spine: Multiple areas of demyelinating lesions 

including L pontine lesion supratentorial lesion, intermedullary plaques C4/5/6/

7


MRI T/L spine pending to exclude Musculoskeletal cause for LLE symptoms.


Neurology, Dr. Bridges, evaluated  - to continue IV Solumedrol for 3-5 days, 

started on Cymbalta, and Fioricet for headache.





2. Leukocytosis - steroid induced.





GI Px - Protonix


DVT Px - Heparin SQ

## 2019-01-19 NOTE — PN
Physical Exam: 


SUBJECTIVE: Patient seen and examined this AM. She states that her blurred 

vision is better but that she is still having some weakness throughout, mostly 

in her left leg and left eye pain. 








OBJECTIVE:





 Vital Signs











 Period  Temp  Pulse  Resp  BP Sys/James  Pulse Ox


 


 Last 24 Hr  97.9 F-98.2 F  60-97  20-20  148-154/85-95  99-99











GENERAL: A&O, no acute distress


EYES: No visual field loss, though blurred vision in the left eye. Left 

pupillary afferent defect.


EARS, NOSE, THROAT: oropharynx clear without exudates. Moist mucous membranes.


LUNGS: CTA b/l, no crackles or wheezes


HEART: Regular rate and rhythm, normal S1 and S2 without murmur


ABDOMEN: Soft, nontender to palpation, normoactive bowel sounds


EXTREMITIES: warm, well-perfused. No peripheral edema. 


NEUROLOGICAL:  Cranial nerves II-XII intact. Normal speech. 4/5 weakness in LLE 


PSYCHIATRIC: Cooperative. Good eye contact. Appropriate mood and affect.














 Laboratory Results - last 24 hr











  01/18/19 01/18/19 01/18/19





  11:35 18:17 22:08


 


WBC   


 


RBC   


 


Hgb   


 


Hct   


 


MCV   


 


MCH   


 


MCHC   


 


RDW   


 


Plt Count   


 


MPV   


 


Sodium   


 


Potassium   


 


Chloride   


 


Carbon Dioxide   


 


Anion Gap   


 


BUN   


 


Creatinine   


 


Creat Clearance w eGFR   


 


POC Glucometer  102.33484  162  140


 


Random Glucose   


 


Calcium   


 


Phosphorus   


 


Magnesium   














  01/19/19 01/19/19 01/19/19





  06:35 06:35 06:44


 


WBC  13.6 H  


 


RBC  4.20  


 


Hgb  13.2  


 


Hct  38.6  


 


MCV  91.8  


 


MCH  31.4  


 


MCHC  34.2  


 


RDW  12.4  


 


Plt Count  261  D  


 


MPV  9.6  


 


Sodium   135 L 


 


Potassium   4.3 


 


Chloride   100 


 


Carbon Dioxide   28 


 


Anion Gap   7 L 


 


BUN   18 


 


Creatinine   0.8 


 


Creat Clearance w eGFR   > 60 


 


POC Glucometer    124


 


Random Glucose   120 H 


 


Calcium   8.3 L 


 


Phosphorus   2.9 


 


Magnesium   2.1 








Active Medications











Generic Name Dose Route Start Last Admin





  Trade Name Freq  PRN Reason Stop Dose Admin


 


Acetaminophen  650 mg  01/17/19 23:49  01/19/19 00:45





  Tylenol -  PO   650 mg





  Q6H PRN   Administration





  PAIN LEVEL 1-5   





     





     





     


 


Acetaminophen/Butalbital/Caffeine  1 tablet  01/18/19 09:27  01/19/19 06:41





  Fioricet -  PO   1 tablet





  DAILY PRN   Administration





  HEADACHE   





     





     





     


 


Duloxetine HCl  20 mg  01/18/19 10:00  01/18/19 10:30





  Cymbalta -  PO   20 mg





  DAILY ZAYNAB   Administration





     





     





     





     


 


Heparin Sodium (Porcine)  5,000 unit  01/18/19 22:00  01/19/19 06:42





  Heparin -  SQ   5,000 unit





  TID ZYANAB   Administration





     





     





     





     


 


Insulin Aspart  1 vial  01/18/19 11:00  01/19/19 06:45





  Novolog Vial Sliding Scale -  SQ   Not Given





  [facility] ZAYNAB   





     





     





  Protocol   





     


 


Methylprednisolone Sodium Succinate  250 mg  01/18/19 21:00  01/19/19 03:09





  Solu-Medrol -  IVPB   250 mg





  Q6H-IV ZAYNAB   Administration





     





     





     





     


 


Pantoprazole Sodium  40 mg  01/19/19 10:00  





  Protonix -  PO   





  DAILY ZAYNAB   





     





     





     





     











ASSESSMENT/PLAN:


34 yo f w/ PMH MS who comes into the ED c/o a 2 week hx of blurred vision, 

headaches and LLE weakness.








Acute Exacerbation of Multiple Sclerosis with Optic Neuritis


-Neurology consult appreciated and case discussed


-SoluMedrol 250 mg IV Q6


-BGMs ACHS with Insulin sliding scale for glycemic control due to high dose 

steroids


-To receive 3 days of steroid and reevaluate, can d/c Sunday if resolved, if 

not continue until Tuesday


-Can receive Fioricet PRN for Headache





Back and left leg pain, likely Lumbar radiculopathy


-MRI lumbar spine pending


-Cymbalta 20 mg PO Daily, can increase to 30 as per neurology if ineffective





DVT Prophylaxis


-Lovenox 40 mg SQ Daily





FEN


-Fluids: none


-Electrolytes: No electrolyte abnormalities, BMP in AM


-Nutrition: Regular Diet





Disposition


Med/Surg








Visit type





- Emergency Visit


Emergency Visit: Yes


ED Registration Date: 01/18/19


Care time: The patient presented to the Emergency Department on the above date 

and was hospitalized for further evaluation of their emergent condition.





- New Patient


This patient is new to me today: No





- Critical Care


Critical Care patient: No

## 2019-01-20 RX ADMIN — ACETAMINOPHEN PRN MG: 325 TABLET ORAL at 17:10

## 2019-01-20 RX ADMIN — INSULIN ASPART SCH: 100 INJECTION, SOLUTION INTRAVENOUS; SUBCUTANEOUS at 21:27

## 2019-01-20 RX ADMIN — HEPARIN SODIUM SCH UNIT: 5000 INJECTION, SOLUTION INTRAVENOUS; SUBCUTANEOUS at 21:43

## 2019-01-20 RX ADMIN — HEPARIN SODIUM SCH: 5000 INJECTION, SOLUTION INTRAVENOUS; SUBCUTANEOUS at 07:11

## 2019-01-20 RX ADMIN — METHYLPREDNISOLONE SODIUM SUCCINATE SCH MG: 125 INJECTION, POWDER, FOR SOLUTION INTRAMUSCULAR; INTRAVENOUS at 14:37

## 2019-01-20 RX ADMIN — HEPARIN SODIUM SCH: 5000 INJECTION, SOLUTION INTRAVENOUS; SUBCUTANEOUS at 14:26

## 2019-01-20 RX ADMIN — PANTOPRAZOLE SODIUM SCH MG: 40 TABLET, DELAYED RELEASE ORAL at 11:01

## 2019-01-20 RX ADMIN — DULOXETINE SCH MG: 20 CAPSULE, DELAYED RELEASE ORAL at 11:01

## 2019-01-20 RX ADMIN — HEPARIN SODIUM SCH: 5000 INJECTION, SOLUTION INTRAVENOUS; SUBCUTANEOUS at 21:19

## 2019-01-20 RX ADMIN — METHYLPREDNISOLONE SODIUM SUCCINATE SCH MG: 125 INJECTION, POWDER, FOR SOLUTION INTRAMUSCULAR; INTRAVENOUS at 03:09

## 2019-01-20 RX ADMIN — INSULIN ASPART SCH: 100 INJECTION, SOLUTION INTRAVENOUS; SUBCUTANEOUS at 17:15

## 2019-01-20 RX ADMIN — INSULIN ASPART SCH: 100 INJECTION, SOLUTION INTRAVENOUS; SUBCUTANEOUS at 11:12

## 2019-01-20 RX ADMIN — INSULIN ASPART SCH: 100 INJECTION, SOLUTION INTRAVENOUS; SUBCUTANEOUS at 08:17

## 2019-01-20 RX ADMIN — METHYLPREDNISOLONE SODIUM SUCCINATE SCH MG: 125 INJECTION, POWDER, FOR SOLUTION INTRAMUSCULAR; INTRAVENOUS at 11:01

## 2019-01-20 RX ADMIN — BUTALBITAL, ACETAMINOPHEN, AND CAFFEINE PRN TABLET: 50; 325; 40 TABLET ORAL at 03:09

## 2019-01-20 RX ADMIN — METHYLPREDNISOLONE SODIUM SUCCINATE SCH MG: 125 INJECTION, POWDER, FOR SOLUTION INTRAMUSCULAR; INTRAVENOUS at 21:20

## 2019-01-20 NOTE — PN
Progress Note (short form)





- Note


Progress Note: 





SUBJECTIVE: Feels improvement in blurred vision but still has pain behind Left 

eye which she says is intense. Headache improving.








OBJECTIVE: Afebrile, Hemodynamically Stable.


 Last Vital Signs











Temp Pulse Resp BP Pulse Ox


 


 98.5 F   65   20   155/96   98 


 


 01/19/19 21:13  01/19/19 21:13  01/19/19 21:13  01/19/19 21:13  01/20/19 03:00








 


HEENT - Atraumatic, Normocephalic.


HEART: S1, S2, RRR


LUNGS: Clear to auscultation - no crackles/wheeze.


ABDOMEN: Soft, non-tender - no guarding or rebound. Bowel Sounds normal.


EXTREMITIES: No edema. No calf tenderness.


NEUROLOGICAL: Alert, oriented. Normal speech. EOMI. Sensation intact. Strength 5

/5 in LUE/RUE/RLE/LLE. DTRs symmetric.





 Laboratory Results - last 24 hr











  01/19/19 01/19/19 01/20/19





  11:17 17:22 08:08


 


POC Glucometer  137  171  163














  01/20/19





  11:10


 


POC Glucometer  122








 Current Medications











Generic Name Dose Route Start Last Admin





  Trade Name Freq  PRN Reason Stop Dose Admin


 


Acetaminophen  650 mg  01/17/19 23:49  01/19/19 14:58





  Tylenol -  PO   650 mg





  Q6H PRN   Administration





  PAIN LEVEL 1-5   





     





     





     


 


Acetaminophen/Butalbital/Caffeine  1 tablet  01/18/19 09:27  01/20/19 03:09





  Fioricet -  PO   1 tablet





  DAILY PRN   Administration





  HEADACHE   





     





     





     


 


Duloxetine HCl  20 mg  01/18/19 10:00  01/20/19 11:01





  Cymbalta -  PO   20 mg





  DAILY ZAYNAB   Administration





     





     





     





     


 


Heparin Sodium (Porcine)  5,000 unit  01/18/19 22:00  01/20/19 07:11





  Heparin -  SQ   Not Given





  TID ZAYNAB   





     





     





     





     


 


Insulin Aspart  1 vial  01/18/19 11:00  01/20/19 11:12





  Novolog Vial Sliding Scale -  SQ   Not Given





  [facility]   





     





     





  Protocol   





     


 


Methylprednisolone Sodium Succinate  250 mg  01/18/19 21:00  01/20/19 11:01





  Solu-Medrol -  IVPB   250 mg





  Q6H-IV ZAYNAB   Administration





     





     





     





     


 


Ondansetron HCl  4 mg  01/19/19 19:47  01/19/19 21:04





  Zofran Injection  IVPUSH   4 mg





  Q8H PRN   Administration





  NAUSEA   





     





     





     


 


Pantoprazole Sodium  40 mg  01/19/19 10:00  01/20/19 11:01





  Protonix -  PO   40 mg





  DAILY ZAYNAB   Administration





     





     





     





     

















ASSESSMENT AND PLAN:





33 year old female with a history of MS who presented to the ED with blurred 

vision, headaches, and left leg pain/weakness for the past 2 weeks. She was 

recently admitted at [facility] 1 week ago and treated with IV steroids for 3 

days then discharged on oral steroid. She is currently referred to the ED from 

Neurologist's office due to persistence of symptoms.





1. Acute exacerbation of multiple sclerosis with optic neuritis and LLE pain/

weakness


Still complains of pain behind Left eye, blurriness improved.


Continue SoluMedrol 250 mg IV q6h


MRI of brain, C Spine - spine: Multiple areas of demyelinating lesions 

including L pontine lesion supratentorial lesion, intermedullary plaques C4/5/6/

7


MRI T/L spine - T9/10demyelinating lesions. No disc disease/herniation/spinal 

stenosis/cord compression.


Neurology, Dr. Bridges, evaluated  - to continue IV Solumedrol for 3-5 days; 

started on Cymbalta, and Fioricet for headache.





2. Leukocytosis - steroid induced.





GI Px - Protonix


DVT Px - Heparin SQ














Visit type





- Emergency Visit


Emergency Visit: Yes


ED Registration Date: 01/18/19


Care time: The patient presented to the Emergency Department on the above date 

and was hospitalized for further evaluation of their emergent condition.





- New Patient


This patient is new to me today: No





- Critical Care


Critical Care patient: No





- Discharge Referral


Referred to [facility] No

## 2019-01-21 RX ADMIN — INSULIN ASPART SCH: 100 INJECTION, SOLUTION INTRAVENOUS; SUBCUTANEOUS at 21:18

## 2019-01-21 RX ADMIN — BUTALBITAL, ACETAMINOPHEN, AND CAFFEINE PRN TABLET: 50; 325; 40 TABLET ORAL at 05:49

## 2019-01-21 RX ADMIN — HEPARIN SODIUM SCH UNIT: 5000 INJECTION, SOLUTION INTRAVENOUS; SUBCUTANEOUS at 21:15

## 2019-01-21 RX ADMIN — INSULIN ASPART SCH: 100 INJECTION, SOLUTION INTRAVENOUS; SUBCUTANEOUS at 17:31

## 2019-01-21 RX ADMIN — HEPARIN SODIUM SCH: 5000 INJECTION, SOLUTION INTRAVENOUS; SUBCUTANEOUS at 15:20

## 2019-01-21 RX ADMIN — PANTOPRAZOLE SODIUM SCH MG: 40 TABLET, DELAYED RELEASE ORAL at 10:58

## 2019-01-21 RX ADMIN — ACETAMINOPHEN PRN MG: 325 TABLET ORAL at 11:21

## 2019-01-21 RX ADMIN — HEPARIN SODIUM SCH: 5000 INJECTION, SOLUTION INTRAVENOUS; SUBCUTANEOUS at 05:36

## 2019-01-21 RX ADMIN — METHYLPREDNISOLONE SODIUM SUCCINATE SCH MG: 125 INJECTION, POWDER, FOR SOLUTION INTRAMUSCULAR; INTRAVENOUS at 21:07

## 2019-01-21 RX ADMIN — INSULIN ASPART SCH: 100 INJECTION, SOLUTION INTRAVENOUS; SUBCUTANEOUS at 06:35

## 2019-01-21 RX ADMIN — INSULIN ASPART SCH: 100 INJECTION, SOLUTION INTRAVENOUS; SUBCUTANEOUS at 11:46

## 2019-01-21 RX ADMIN — ACETAMINOPHEN PRN MG: 325 TABLET ORAL at 17:45

## 2019-01-21 RX ADMIN — ONDANSETRON PRN MG: 2 INJECTION INTRAMUSCULAR; INTRAVENOUS at 05:49

## 2019-01-21 RX ADMIN — METHYLPREDNISOLONE SODIUM SUCCINATE SCH MG: 125 INJECTION, POWDER, FOR SOLUTION INTRAMUSCULAR; INTRAVENOUS at 15:19

## 2019-01-21 RX ADMIN — INSULIN ASPART SCH: 100 INJECTION, SOLUTION INTRAVENOUS; SUBCUTANEOUS at 11:58

## 2019-01-21 RX ADMIN — ONDANSETRON PRN MG: 2 INJECTION INTRAMUSCULAR; INTRAVENOUS at 17:28

## 2019-01-21 RX ADMIN — DULOXETINE SCH MG: 20 CAPSULE, DELAYED RELEASE ORAL at 10:57

## 2019-01-21 RX ADMIN — METHYLPREDNISOLONE SODIUM SUCCINATE SCH MG: 125 INJECTION, POWDER, FOR SOLUTION INTRAMUSCULAR; INTRAVENOUS at 10:57

## 2019-01-21 RX ADMIN — METHYLPREDNISOLONE SODIUM SUCCINATE SCH MG: 125 INJECTION, POWDER, FOR SOLUTION INTRAMUSCULAR; INTRAVENOUS at 03:18

## 2019-01-21 RX ADMIN — POLYETHYLENE GLYCOL 3350 SCH GM: 17 POWDER, FOR SOLUTION ORAL at 15:19

## 2019-01-21 NOTE — CON.CARD
Consult


Consult Specialty:: Cardiology


Referred by:: Hospitalist


Reason for Consultation:: Cardiac evaluation





- History of Present Illness


Chief Complaint: Admitted with MS and optic neuritis


History of Present Illness: 








Patient is a 33 year old female with underlying history of MS who comes in with 

history of blurred vision, headache and both upper and lower extremity 

weakness. She was recently hospitalized at [facility] for treatment of optic neuritis and presented with above symptoms. She 

was given large amount of steroids and after being discharged, she was followed 

up with Dr. Bridges. She was referred for admission as per Dr. Bridges for 

further treatment in hospital. She was given NS bolus for orthostasis and 

currently has resting BP of 160-180 systolic. Standing BP is 114 systolic with 

HR of 117 bpm. She denies chest pain, SOB or palpitations. She denies 

paroxysmal nocturnal dyspnea or orthopnea. She denies fever or chills. She 

denies nausea, vomiting, diarrhea or abdominal pain. She complains of 

intermittent headach and still has blurred vision and weakness.





- History Source


History Provided By: Patient, Medical Record


Limitations to Obtaining History: No Limitations





- Past Medical History


CNS: Yes: Other (Multiple sclerosis)


...LMP: 01/02/19


...Pregnant: No





- Past Surgical History


Past Surgical History: Yes: None





- Alcohol/Substance Use


Hx Alcohol Use: No





- Smoking History


Smoking history: Never smoked


Have you smoked in the past 12 months: No





Home Medications





- Allergies


Allergies/Adverse Reactions: 


 Allergies











Allergy/AdvReac Type Severity Reaction Status Date / Time


 


No Known Allergies Allergy   Verified 01/17/19 19:07














- Home Medications


Home Medications: 


Ambulatory Orders





Unobtainable  01/17/19 











Family Disease History





- Family Disease History


Family Disease History: Other: Grandparent (stroke and ppm)


Other Family History: History of HTN





Review of Systems





- Review of Systems


Constitutional: denies: Chills, Fever


Cardiovascular: reports: Chest Pain.  denies: Palpitations, Shortness of Breath


Respiratory: denies: Cough, Hemoptysis, SOB, SOB on Exertion


Gastrointestinal: denies: Abdominal Pain, Constipation, Diarrhea, Melena, Nausea

, Rectal Bleeding, Vomiting


Musculoskeletal: denies: Joint Pain


Neurological: reports: Headache, Weakness.  denies: Dizziness, Seizure, Syncope


Vital Signs: 


 Vital Signs











Temperature  98 F   01/21/19 15:37


 


Pulse Rate  104 H  01/21/19 15:39


 


Respiratory Rate  18   01/21/19 13:00


 


Blood Pressure  127/90   01/21/19 15:39


 


O2 Sat by Pulse Oximetry (%)  97   01/21/19 10:00











Eyes: Yes: PERRL


HENT: Yes: Atraumatic


Neck: Yes: Supple


Respiratory: Yes: CTA Bilaterally


Gastrointestinal: Yes: Normal Bowel Sounds, Soft.  No: Tenderness


Cardiovascular: Yes: Regular Rate and Rhythm


JVD: No


Carotid Bruit: No


PMI: Non-Displaced


Heart Sounds: Yes: S1, S2


Murmur: No: Systolic Murmur, Diastolic Murmur


Edema: No





- Other Data


Labs, Other Data: 


 CBC, BMP





 01/19/19 06:35 





 01/19/19 06:35 





 INR, PTT











INR  0.98  (0.83-1.09)   01/18/19  05:20    

















Pending


Echo: Pending





Imaging





- Results


MRI: Report Reviewed (Brain MRI: supratentorial demyelination Thoracic and 

lumbar MRI: demyelinating plaques)





Problem List





- Problems


(1) Elevated blood pressure reading


Code(s): R03.0 - ELEVATED BLOOD-PRESSURE READING, W/O DIAGNOSIS OF HTN   





(2) Orthostasis


Code(s): I95.1 - ORTHOSTATIC HYPOTENSION   





(3) Exacerbation of multiple sclerosis


Code(s): G35 - MULTIPLE SCLEROSIS   





(4) Optic neuritis due to multiple sclerosis


Code(s): H46.9 - UNSPECIFIED OPTIC NEURITIS; G35 - MULTIPLE SCLEROSIS   





Assessment/Plan





1. Fluctuating blood pressure with periods of HTN and with orthostasis


2. Acute MS with optic neuritis and both upper and lower extremity weakness


3. Elevated WBC





PLAN:


1. Avoid excessive fluid boluses


2. Monitor BP closely which can be elevated due to steroids and the orthostasis 

possibly autonomic. Patient does not appear dehydrated


3. Agree with echocardiography to assess LV/RV and valvular function


4. ECG





Further plans are to follow


Collin Marquez MD

## 2019-01-21 NOTE — PN
Progress Note, Physician


Chief Complaint: 


MS exacerbation





History of Present Illness: 


Notified today of patient that is in the hospital for MS exacerbation, and 

asked by Dr Calloway to f/u patient who was followed by Dr. Bridges, as I am 

covering him.  This si a 33 year old woman who has MS since 2007, initially 

diagnosed with episode of optic neuritis at [facility].  She has had 

recurrent episodes of optic neuritis as well, primarily in the left eye, as 

well as pain in the legs.  She was on Avonex, then Copaxone, and Rebif, but didn

't like the latter so went back to Avonex.  She transferred her care to Dr. Bridges, she says because she didn't like that at [place] she kept getting 

different doctors and she wanted one MD who knew her.  Recently Dr. Bridges 

tried to have her initiate Tecfidera, but she stopped after a week, because she 

found that the flushing and abdominal discomfort were more than she could bear, 

even for a short time.  She then went back to Avonex about a month ago.  

Coincident with her first dose starting back on the Avonex, she noticed 

blurring of her right eye, which normally doesn't get involved with optic 

neuritis.  Two weeks later her left eye then became blurred again, severe 

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 

.  She presented to the [facility] ER and was admitted for 3 days of IV 

steroids followed by an outpatient taper.  She wasn't better so then went to 

's office and he readmitted her for a 5 day IV steroid course, and 

while she is better than she had been, she is not back to baseline.  








- Current Medication List


Current Medications: 


Active Medications





Acetaminophen (Tylenol -)  650 mg PO Q6H PRN


   PRN Reason: PAIN LEVEL 1-5


   Last Admin: 01/21/19 11:21 Dose:  650 mg


Acetaminophen/Butalbital/Caffeine (Fioricet -)  1 tablet PO DAILY PRN


   PRN Reason: HEADACHE


   Last Admin: 01/21/19 05:49 Dose:  1 tablet


Duloxetine HCl (Cymbalta -)  20 mg PO DAILY [facility]


   Last Admin: 01/21/19 10:57 Dose:  20 mg


Heparin Sodium (Porcine) (Heparin -)  5,000 unit SQ TID [facility]


   Last Admin: 01/21/19 05:36 Dose:  Not Given


Insulin Aspart (Novolog Vial Sliding Scale -)  1 vial SQ ACHS [facility]; Protocol


   Last Admin: 01/21/19 11:58 Dose:  Not Given


Methylprednisolone Sodium Succinate (Solu-Medrol -)  250 mg IVPB Q6H-IV ZAYNAB


   Last Admin: 01/21/19 10:57 Dose:  250 mg


Ondansetron HCl (Zofran Injection)  4 mg IVPUSH Q8H PRN


   PRN Reason: NAUSEA


   Last Admin: 01/21/19 05:49 Dose:  4 mg


Pantoprazole Sodium (Protonix -)  40 mg PO DAILY [facility]


   Last Admin: 01/21/19 10:58 Dose:  40 mg











- Objective


Vital Signs: 


 Vital Signs











Temperature  98.6 F   01/21/19 13:00


 


Pulse Rate  104 H  01/21/19 13:00


 


Respiratory Rate  18   01/21/19 13:00


 


Blood Pressure  185/104 H  01/21/19 13:00


 


O2 Sat by Pulse Oximetry (%)  96   01/20/19 11:00











Neurological: Yes: Alert, Oriented, Babinski negative, Cran Nerves II-XII 

Intact (except for reduced subjective acuity in the left eye.  Red desaturation 

in the left eye.), Other (No pronator drift.  Full motor strength.  Sensation 

OK.)


Labs: 


 CBC, BMP





 01/19/19 06:35 





 01/19/19 06:35 





 INR, PTT











INR  0.98  (0.83-1.09)   01/18/19  05:20    














- ....Imaging


MRI: Report Reviewed, Image Reviewed (Brain several lesions, none with 

enhancement.  No enhancement reported. Looking for it, I can possibly see some 

enhancement faintly along the left optic nerve, but its hard to be certain on 

this screen.  T spine and C spine  and L spine reviewed.   These were done 

without contrast and the t and c spine showed demyelinating plaques but its 

impossible to tell whether these were acute or not.)





Problem List





- Problems


(1) Exacerbation of multiple sclerosis


Code(s): G35 - MULTIPLE SCLEROSIS   





(2) Optic neuritis due to multiple sclerosis


Code(s): H46.9 - UNSPECIFIED OPTIC NEURITIS; G35 - MULTIPLE SCLEROSIS   





Assessment/Plan


Improving optic neuritis though not back to baseline.  Would continue through 

day 5 of Solumedrol.  Recommend ophthalmology evaluation, though not certain 

that that needs to occur in house.  I'll let Dr. Bridges know that I saw her.


Thanks.

## 2019-01-21 NOTE — PN
Progress Note (short form)





- Note


Progress Note: 





SUBJECTIVE: Feels improvement in blurred vision but still has pain behind Left 

eye. Ongoing LLE pain. Lightheadedness on ambulation this AM








OBJECTIVE: Afebrile, Hemodynamically Stable.


 Last Vital Signs











Temp Pulse Resp BP Pulse Ox


 


 98.6 F   108 H  18   180/103 H  96 


 


 01/21/19 13:00  01/21/19 13:30  01/21/19 13:00  01/21/19 13:30  01/20/19 11:00











 


HEENT - Atraumatic, Normocephalic.


HEART: S1, S2, RRR


LUNGS: Clear to auscultation - no crackles/wheeze.


ABDOMEN: Soft, non-tender - no guarding or rebound. Bowel Sounds normal.


EXTREMITIES: No edema. No calf tenderness.


NEUROLOGICAL: AAO x 3. Normal speech. EOMI. Sensation intact. Strength 5/5 in 

LUE/RUE/RLE/LLE. DTRs symmetric.


 


 Laboratory Results - last 24 hr











  01/20/19 01/20/19 01/21/19





  17:13 21:26 05:55


 


POC Glucometer  205  150  124














  01/21/19





  11:56


 


POC Glucometer  122








 Current Medications











Generic Name Dose Route Start Last Admin





  Trade Name Freq  PRN Reason Stop Dose Admin


 


Acetaminophen  650 mg  01/17/19 23:49  01/21/19 11:21





  Tylenol -  PO   650 mg





  Q6H PRN   Administration





  PAIN LEVEL 1-5   





     





     





     


 


Acetaminophen/Butalbital/Caffeine  1 tablet  01/18/19 09:27  01/21/19 05:49





  Fioricet -  PO   1 tablet





  DAILY PRN   Administration





  HEADACHE   





     





     





     


 


Duloxetine HCl  20 mg  01/18/19 10:00  01/21/19 10:57





  Cymbalta -  PO   20 mg





  DAILY ZAYNAB   Administration





     





     





     





     


 


Heparin Sodium (Porcine)  5,000 unit  01/18/19 22:00  01/21/19 05:36





  Heparin -  SQ   Not Given





  TID [facility]   





     





     





     





     


 


Insulin Aspart  1 vial  01/18/19 11:00  01/21/19 11:58





  Novolog Vial Sliding Scale -  SQ   Not Given





  ACHS [facility]   





     





     





  Protocol   





     


 


Methylprednisolone Sodium Succinate  250 mg  01/18/19 21:00  01/21/19 10:57





  Solu-Medrol -  IVPB   250 mg





  Q6H-IV ZAYNAB   Administration





     





     





     





     


 


Ondansetron HCl  4 mg  01/19/19 19:47  01/21/19 05:49





  Zofran Injection  IVPUSH   4 mg





  Q8H PRN   Administration





  NAUSEA   





     





     





     


 


Pantoprazole Sodium  40 mg  01/19/19 10:00  01/21/19 10:58





  Protonix -  PO   40 mg





  DAILY ZAYNAB   Administration





     





     





     





     














 


ASSESSMENT AND PLAN:





33 year old female with a history of MS who presented to the ED with blurred 

vision, headaches, and left leg pain/weakness for the past 2 weeks. She was 

recently admitted at [facility] 1 week ago and treated with IV steroids for 3 

days then discharged on oral steroid. She was referred to the ED from 

Neurologist Dr. Bridges's office due to persistence of symptoms.





1. Acute exacerbation of multiple sclerosis with optic neuritis and LLE pain/

weakness


Still complains of pain behind Left eye, blurriness improved.


MRI of brain, C Spine - spine: Multiple areas of demyelinating lesions 

including L pontine lesion supratentorial lesion, intermedullary plaques C4/5/6/

7


MRI T/L spine - T9/10demyelinating lesions. No disc disease/herniation/spinal 

stenosis/cord compression.


Neurology, Dr. Bridges, evaluated  - to continue IV Solumedrol for 5 days. Also 

started on Cymbalta and Fioricet for headache.


Ophthalmology eval as out-patient as per Neurology.





2. Leukocytosis - steroid induced.





3. Orthostasis with Lightheadedness on ambulation. Will request Echo.





GI Px - Protonix (while on IV Steroid)


DVT Px - Heparin SQ














Visit type





- Emergency Visit


Emergency Visit: Yes


ED Registration Date: 01/18/19


Care time: The patient presented to the Emergency Department on the above date 

and was hospitalized for further evaluation of their emergent condition.





- New Patient


This patient is new to me today: No





- Critical Care


Critical Care patient: No





- Discharge Referral


Referred to [facility] No

## 2019-01-22 RX ADMIN — INSULIN ASPART SCH: 100 INJECTION, SOLUTION INTRAVENOUS; SUBCUTANEOUS at 17:14

## 2019-01-22 RX ADMIN — METHYLPREDNISOLONE SODIUM SUCCINATE SCH MG: 125 INJECTION, POWDER, FOR SOLUTION INTRAMUSCULAR; INTRAVENOUS at 15:17

## 2019-01-22 RX ADMIN — HEPARIN SODIUM SCH: 5000 INJECTION, SOLUTION INTRAVENOUS; SUBCUTANEOUS at 15:17

## 2019-01-22 RX ADMIN — INSULIN ASPART SCH: 100 INJECTION, SOLUTION INTRAVENOUS; SUBCUTANEOUS at 12:39

## 2019-01-22 RX ADMIN — METHYLPREDNISOLONE SODIUM SUCCINATE SCH MG: 125 INJECTION, POWDER, FOR SOLUTION INTRAMUSCULAR; INTRAVENOUS at 09:25

## 2019-01-22 RX ADMIN — HEPARIN SODIUM SCH: 5000 INJECTION, SOLUTION INTRAVENOUS; SUBCUTANEOUS at 05:27

## 2019-01-22 RX ADMIN — POLYETHYLENE GLYCOL 3350 SCH GM: 17 POWDER, FOR SOLUTION ORAL at 09:27

## 2019-01-22 RX ADMIN — DULOXETINE SCH MG: 20 CAPSULE, DELAYED RELEASE ORAL at 09:27

## 2019-01-22 RX ADMIN — INSULIN ASPART SCH: 100 INJECTION, SOLUTION INTRAVENOUS; SUBCUTANEOUS at 21:33

## 2019-01-22 RX ADMIN — METHYLPREDNISOLONE SODIUM SUCCINATE SCH MG: 125 INJECTION, POWDER, FOR SOLUTION INTRAMUSCULAR; INTRAVENOUS at 20:47

## 2019-01-22 RX ADMIN — HEPARIN SODIUM SCH: 5000 INJECTION, SOLUTION INTRAVENOUS; SUBCUTANEOUS at 21:33

## 2019-01-22 RX ADMIN — METHYLPREDNISOLONE SODIUM SUCCINATE SCH MG: 125 INJECTION, POWDER, FOR SOLUTION INTRAMUSCULAR; INTRAVENOUS at 02:53

## 2019-01-22 RX ADMIN — INSULIN ASPART SCH: 100 INJECTION, SOLUTION INTRAVENOUS; SUBCUTANEOUS at 06:26

## 2019-01-22 RX ADMIN — PANTOPRAZOLE SODIUM SCH MG: 40 TABLET, DELAYED RELEASE ORAL at 09:25

## 2019-01-22 RX ADMIN — BUTALBITAL, ACETAMINOPHEN, AND CAFFEINE PRN TABLET: 50; 325; 40 TABLET ORAL at 05:34

## 2019-01-22 NOTE — PN
Progress Note (short form)





- Note


Progress Note: 











                  Neurology








HISTORY OF PRESENT ILLNESS:


The patient is a 34 yo f w/ PMH MS who comes into the ED c/o a 2 week hx of 

blurred vision, headaches and LLE weakness. The patient sought medical 

attention at [facility] and was admitted there for treatment of these complaints. 

She was treated with 1g of IV solumedrol for three days and discharged home 

with a large amount of PO steroids and instructed to follow up. The patient's 

symptoms had not resolved on discharge and therefore saw me in the office on 1/ 18 and referred her for admission upon hearing that her symptoms persisted. 

Imaging was not done at [facility]. Patient also complains of headaches 2/2 to 

the blurred vision as well as LLE weakness along with radicular pain. Patient 

denied loss of sensation or paralysis. Patient denies slurred speech or facial 

droop. Completed MRI brain with hypertense L pontine lesion. Supratentorial 

white matter plaques as well. On C Spine MRI, C4 plaque as well as smaller C5, 

C6, C7 plauques noted. T spine MRI completed and with T9 and T10 lesions/

plaques noted. MRI L spine reviewed and without plaques. SHe does not feel she 

has improved despite 5 days course of steroids completed tonight. Reached out 

to [place]  to facilitate transfer to [place] for advanced 

care. Patient provided multiple options but prefers transfer. Discussed with 

nurse and hospitalist, both aware. 








Allergies





No Known Allergies Allergy (Verified 01/17/19 19:07)


 








Active Medications





Acetaminophen (Tylenol -)  650 mg PO Q6H PRN


   PRN Reason: PAIN LEVEL 1-5


   Last Admin: 01/21/19 17:45 Dose:  650 mg


Acetaminophen/Butalbital/Caffeine (Fioricet -)  1 tablet PO DAILY PRN


   PRN Reason: HEADACHE


   Last Admin: 01/22/19 05:34 Dose:  1 tablet


Duloxetine HCl (Cymbalta -)  20 mg PO DAILY [facility]


   Last Admin: 01/21/19 10:57 Dose:  20 mg


Heparin Sodium (Porcine) (Heparin -)  5,000 unit SQ TID [facility]


   Last Admin: 01/22/19 05:27 Dose:  Not Given


Insulin Aspart (Novolog Vial Sliding Scale -)  1 vial SQ ACHS [facility]; Protocol


   Last Admin: 01/22/19 06:26 Dose:  Not Given


Methylprednisolone Sodium Succinate (Solu-Medrol -)  250 mg IVPB Q6H-IV [facility]


   Last Admin: 01/22/19 02:53 Dose:  250 mg


Ondansetron HCl (Zofran Injection)  4 mg IVPUSH Q8H PRN


   PRN Reason: NAUSEA


   Last Admin: 01/21/19 17:28 Dose:  4 mg


Pantoprazole Sodium (Protonix -)  40 mg PO DAILY [facility]


   Last Admin: 01/21/19 10:58 Dose:  40 mg


Polyethylene Glycol (Miralax (For Daily Use) -)  17 gm PO DAILY [facility]


   Last Admin: 01/21/19 15:19 Dose:  17 gm








PHYSICAL EXAMINATION


  


 Vital Signs











 Period  Temp  Pulse  Resp  BP Sys/James  Pulse Ox


 


 Last 24 Hr  97.9 F-98.6 F    18-20  /  97

















GENERAL: Awake, alert, and fully oriented, in no acute distress.


HEAD: Normal with no signs of trauma.


EYES: Pupils equal, round. Left pupil sluggish in response to light. Right 

pupil with brisk response to light. Consensual pupillary reaction intact.  

extraocular movements intact. Patient endorses left eye pain upon asked to look 

up and to the right. sclera anicteric, conjunctiva clear. No lid lag.


LUNGS: Breath sounds equal, clear to auscultation bilaterally. No wheezes, and 

no crackles. No accessory muscle use.


HEART: Regular rate and rhythm, normal S1 and S2 without murmur, rub or gallop.


ABDOMEN: Soft, nontender, not distended, normoactive bowel sounds, no guarding, 

no rebound, no masses.  No hepatomegaly or  splenomegaly. 


LOWER EXTREMITIES: 2+ pulses, warm, well-perfused. No calf tenderness. No 

peripheral edema. 


NEUROLOGICAL:  Cranial nerves II-X intact except for eyes noted above. Normal 

speech. Strength 5/5 in both upper extremities, 5/5 in the right lower extremity

, 5-/5 in left lower extremity. 


SKIN: Warm, dry, normal turgor, no rashes or lesions noted, normal capillary 

refill. 





 CBCD











WBC  13.6 K/mm3 (4.0-10.0)  H  01/19/19  06:35    


 


RBC  4.20 M/mm3 (3.60-5.2)   01/19/19  06:35    


 


Hgb  13.2 GM/dL (10.7-15.3)   01/19/19  06:35    


 


Hct  38.6 % (32.4-45.2)   01/19/19  06:35    


 


MCV  91.8 fl (80-96)   01/19/19  06:35    


 


MCHC  34.2 g/dl (32.0-36.0)   01/19/19  06:35    


 


RDW  12.4 % (11.6-15.6)   01/19/19  06:35    


 


Plt Count  261 K/MM3 (134-434)  D 01/19/19  06:35    


 


MPV  9.6 fl (7.5-11.1)   01/19/19  06:35    








 CMP











Sodium  135 mmol/L (136-145)  L  01/19/19  06:35    


 


Potassium  4.3 mmol/L (3.5-5.1)   01/19/19  06:35    


 


Chloride  100 mmol/L ()   01/19/19  06:35    


 


Carbon Dioxide  28 mmol/L (21-32)   01/19/19  06:35    


 


Anion Gap  7 MMOL/L (8-16)  L  01/19/19  06:35    


 


BUN  18 mg/dL (7-18)   01/19/19  06:35    


 


Creatinine  0.8 mg/dL (0.55-1.3)   01/19/19  06:35    


 


Creat Clearance w eGFR  > 60  (>60)   01/19/19  06:35    


 


Random Glucose  120 mg/dL ()  H  01/19/19  06:35    


 


Calcium  8.3 mg/dL (8.5-10.1)  L  01/19/19  06:35    


 


Total Bilirubin  0.4 mg/dL (0.2-1)   01/17/19  18:40    


 


AST  41 U/L (15-37)  H  01/17/19  18:40    


 


ALT  33 U/L (13-61)   01/17/19  18:40    


 


Alkaline Phosphatase  49 U/L ()   01/17/19  18:40    


 


Total Protein  7.5 g/dl (6.4-8.2)   01/17/19  18:40    


 


Albumin  3.5 g/dl (3.4-5.0)   01/17/19  18:40    











MRI Brain reviewed


MRI C spine reviewed


MRI T spine reviewed


MRI L spine reviewed





ASSESSMENT/PLAN:


 34 yo f w/ PMH MS who comes into the ED c/o a 2 week hx of blurred vision, 

headaches and LLE weakness. The patient sought medical attention at [facility] 

and was admitted there for treatment of these complaints. She was treated with 

1g of IV solumedrol for three days and discharged home with a large amount of 

PO steroids and instructed to follow up. The patient's symptoms had not 

resolved on discharge and therefore saw me in the office on 1/18 and referred 

her for admission upon hearing that her symptoms persisted. Imaging was not 

done at [facility]. Patient also complains of headaches 2/2 to the blurred vision 

as well as LLE weakness along with radicular pain. Patient denied loss of 

sensation or paralysis. Patient denies slurred speech or facial droop. 

Completed MRI brain with hypertense L pontine lesion. Supratentorial white 

matter plaques as well. On C Spine MRI, C4 plaque as well as smaller C5, C6, C7 

plauques noted. T spine MRI completed and with T9 and T10 lesions/plaques 

noted. MRI L spine reviewed and without plaques. SHe does not feel she has 

improved despite 5 days course of steroids to be completed tonight. Reached out 

to [place]  to facilitate transfer to [place] for advanced 

care. Patient provided multiple options but prefers transfer. Discussed with 

nurse and hospitalist, both aware. Remains on duloxetine 20mg for symptoms of 

lumbar radiculopathy for now. Fioricet for tension headaches. Continue steroids 

for now, completion tonight, RISS, PPI, monitor glucose.

## 2019-01-22 NOTE — PN
Physical Exam: 


SUBJECTIVE: Patient seen and examined this AM. She states her weakness has 

improved but is still present and specifically her optic neuritis is still 

present following full course of steroids.








OBJECTIVE:





 Vital Signs











 Period  Temp  Pulse  Resp  BP Sys/James  Pulse Ox


 


 Last 24 Hr  97.1 F-98.3 F    18-20  114-194/  











GENERAL: A&O, no acute distress


EYES: No visual field loss, though blurred vision in the left eye. Left 

pupillary afferent defect


EARS, NOSE, THROAT: oropharynx clear without exudates. Moist mucous membranes.


LUNGS: CTA b/l, no crackles or wheezes


HEART: Regular rate and rhythm, normal S1 and S2 without murmur


ABDOMEN: Soft, nontender to palpation, normoactive bowel sounds


EXTREMITIES: warm, well-perfused. No peripheral edema. 


NEUROLOGICAL:  Cranial nerves II-XII intact. Normal speech. 5/5 strength now in 

LLE 


PSYCHIATRIC: Cooperative. Good eye contact. Appropriate mood and affect.














 Laboratory Results - last 24 hr











  01/21/19 01/21/19 01/22/19





  17:29 21:18 05:36


 


POC Glucometer  150  131  117














  01/22/19





  12:29


 


POC Glucometer  111








Active Medications











Generic Name Dose Route Start Last Admin





  Trade Name Freq  PRN Reason Stop Dose Admin


 


Acetaminophen  650 mg  01/17/19 23:49  01/21/19 17:45





  Tylenol -  PO   650 mg





  Q6H PRN   Administration





  PAIN LEVEL 1-5   





     





     





     


 


Acetaminophen/Butalbital/Caffeine  1 tablet  01/18/19 09:27  01/22/19 05:34





  Fioricet -  PO   1 tablet





  DAILY PRN   Administration





  HEADACHE   





     





     





     


 


Duloxetine HCl  20 mg  01/18/19 10:00  01/22/19 09:27





  Cymbalta -  PO   20 mg





  DAILY ZAYNAB   Administration





     





     





     





     


 


Heparin Sodium (Porcine)  5,000 unit  01/18/19 22:00  01/22/19 15:17





  Heparin -  SQ   Not Given





  TID [facility]   





     





     





     





     


 


Insulin Aspart  1 vial  01/18/19 11:00  01/22/19 12:39





  Novolog Vial Sliding Scale -  SQ   Not Given





  ACHS [facility]   





     





     





  Protocol   





     


 


Methylprednisolone Sodium Succinate  250 mg  01/18/19 21:00  01/22/19 15:17





  Solu-Medrol -  IVPB   250 mg





  Q6H-IV ZAYNAB   Administration





     





     





     





     


 


Ondansetron HCl  4 mg  01/19/19 19:47  01/21/19 17:28





  Zofran Injection  IVPUSH   4 mg





  Q8H PRN   Administration





  NAUSEA   





     





     





     


 


Pantoprazole Sodium  40 mg  01/19/19 10:00  01/22/19 09:25





  Protonix -  PO   40 mg





  DAILY ZAYNAB   Administration





     





     





     





     


 


Polyethylene Glycol  17 gm  01/21/19 14:15  01/22/19 09:27





  Miralax (For Daily Use) -  PO   17 gm





  DAILY ZAYNAB   Administration





     





     





     





     











ASSESSMENT/PLAN:


32 yo f w/ PMH MS who comes into the ED c/o a 2 week hx of blurred vision, 

headaches and LLE weakness.








Acute Exacerbation of Multiple Sclerosis with Optic Neuritis


-Neurology consult appreciated and case discussed


-SoluMedrol 250 mg IV Q6


-BGMs ACHS with Insulin sliding scale for glycemic control due to high dose 

steroids


-Received 5 days of high dose Solumedrol without much improvement


-Can receive Fioricet PRN for Headache


-Options discussed with pt as per neurology, at this time likely best to 

transfer to [facility] for further management. 





Back and left leg pain


-MRI lumbar spine without concerning compression


-Cymbalta 20 mg PO Daily, can increase to 30 as per neurology if ineffective





DVT Prophylaxis


-Lovenox 40 mg SQ Daily





FEN


-Fluids: none


-Electrolytes: No electrolyte abnormalities, BMP in AM


-Nutrition: Regular Diet





Disposition


Med/Surg





Visit type





- Emergency Visit


Emergency Visit: Yes


ED Registration Date: 01/18/19


Care time: The patient presented to the Emergency Department on the above date 

and was hospitalized for further evaluation of their emergent condition.





- New Patient


This patient is new to me today: No





- Critical Care


Critical Care patient: No

## 2019-01-22 NOTE — PN
Teaching Attending Note


Name of Resident: Zaki Camacho





ATTENDING PHYSICIAN STATEMENT





I saw and evaluated the patient.


I reviewed the resident's note and discussed the case with the resident.


I agree with the resident's findings and plan as documented.








SUBJECTIVE: Patient complains of weakness and pain in both legs.








OBJECTIVE:


 Vital Signs











 Period  Temp  Pulse  Resp  BP Sys/James  Pulse Ox


 


 Last 24 Hr  97.1 F-98.3 F    18-20  114-194/  








HEART: S1S2, RRR


LUNGS: Clear


ABDOMEN: Soft, non-tender, non-distended, normal BS


EXTREMITIES: No edema


NEUROLOGICAL: Alert, oriented, left pupil sluggish, visual fields intact, 

strength intact, sensation intact





 Laboratory Results - last 24 hr











  01/21/19 01/21/19 01/22/19





  17:29 21:18 05:36


 


POC Glucometer  150  131  117














  01/22/19





  12:29


 


POC Glucometer  111








 Current Medications











Generic Name Dose Route Start Last Admin





  Trade Name Freq  PRN Reason Stop Dose Admin


 


Acetaminophen  650 mg  01/17/19 23:49  01/21/19 17:45





  Tylenol -  PO   650 mg





  Q6H PRN   Administration





  PAIN LEVEL 1-5   





     





     





     


 


Acetaminophen/Butalbital/Caffeine  1 tablet  01/18/19 09:27  01/22/19 05:34





  Fioricet -  PO   1 tablet





  DAILY PRN   Administration





  HEADACHE   





     





     





     


 


Duloxetine HCl  20 mg  01/18/19 10:00  01/22/19 09:27





  Cymbalta -  PO   20 mg





  DAILY ZAYNAB   Administration





     





     





     





     


 


Heparin Sodium (Porcine)  5,000 unit  01/18/19 22:00  01/22/19 15:17





  Heparin -  SQ   Not Given





  TID [facility]   





     





     





     





     


 


Insulin Aspart  1 vial  01/18/19 11:00  01/22/19 12:39





  Novolog Vial Sliding Scale -  SQ   Not Given





  ACHS [facility]   





     





     





  Protocol   





     


 


Methylprednisolone Sodium Succinate  250 mg  01/18/19 21:00  01/22/19 15:17





  Solu-Medrol -  IVPB   250 mg





  Q6H-IV ZAYNAB   Administration





     





     





     





     


 


Ondansetron HCl  4 mg  01/19/19 19:47  01/21/19 17:28





  Zofran Injection  IVPUSH   4 mg





  Q8H PRN   Administration





  NAUSEA   





     





     





     


 


Pantoprazole Sodium  40 mg  01/19/19 10:00  01/22/19 09:25





  Protonix -  PO   40 mg





  DAILY ZAYNAB   Administration





     





     





     





     


 


Polyethylene Glycol  17 gm  01/21/19 14:15  01/22/19 09:27





  Miralax (For Daily Use) -  PO   17 gm





  DAILY ZAYNAB   Administration





     





     





     





     














ASSESSMENT AND PLAN:


This is a 33 year old woman with a history of MS who presented to the ED with 

blurred vision, headaches, and left leg pain and weakness. 





1. Acute exacerbation of multiple sclerosis with optic neuritis and LLE pain/

weakness


   - Did not respond to SoluMedrol


   - Arrangements being made for transfer to [place]


2. Tension headache


   - Continue Cymbalta, Fioricet as needed


3. Leukocytosis


   - Steroid-induced


4. Orthostasis


   - Echo shows normal LV, LVEF 65%, normal RV, trace TR

## 2019-01-22 NOTE — DS
Physical Exam: 


SUBJECTIVE: Patient seen and examined this AM. states that she still feels weak 

throughout and concerned with her variances in blood pressure. she states that 

she is still having significant pain and some blurred vision on her left side.








OBJECTIVE:





 Vital Signs











 Period  Temp  Pulse  Resp  BP Sys/James  Pulse Ox


 


 Last 24 Hr  97.1 F-99 F    18-20  125-194/  








PHYSICAL EXAM





GENERAL: A&O, no acute distress


EYES: No visual field loss, though blurred vision in the left eye. Left 

pupillary afferent defect


EARS, NOSE, THROAT: oropharynx clear without exudates. Moist mucous membranes.


LUNGS: CTA b/l, no crackles or wheezes


HEART: Regular rate and rhythm, normal S1 and S2 without murmur


ABDOMEN: Soft, nontender to palpation, normoactive bowel sounds


EXTREMITIES: warm, well-perfused. No peripheral edema. 


NEUROLOGICAL:  Cranial nerves II-XII intact. Normal speech. 5/5 strength now in 

LLE 


PSYCHIATRIC: Cooperative. Good eye contact. Appropriate mood and affect.





LABS


 Laboratory Results - last 24 hr











  01/21/19 01/21/19 01/22/19





  17:29 21:18 05:36


 


POC Glucometer  150  131  117














  01/22/19





  12:29


 


POC Glucometer  111











HOSPITAL COURSE:





Date of Admission:01/18/19





Date of Discharge: 01/22/19








HPI on Admission:


The patient is a 32 yo f w/ PMH MS who comes into the ED c/o a 2 week hx of 

blurred vision, headaches and LLE weakness. The patient sought medical 

attention at [facility] and was admitted there for treatment of these complaints. 

Per the patient, she was treated with 3 doses (amount unknown) of IV steroids 

and discharged home with a large amount of PO steroids and instructed to follow 

up. The patient's symptoms had not resolved on discharge. Today, the patient 

saw her neurologist, Dr. Bridges, who referred her for admission upon hearing 

that her symptoms were not improving. Patient also complains of headaches 2/2 

to the blurred vision as well as LLE weakness for the same period of time. 

Patient denies loss of sensation or paralysis. Patient denies slurred speech or 

facial droop. Patient denies Chest pain, SOB, fevers, chills, dysuria. 





ER course was notable for:


(1) Dr. Bridges consulted from the ER; suggests 250mg medrol Q6h and 40mg IV 

protonix daily as well as MRI of brain, and cervical, thoracic and lumbar spine 

w/ w/o contrast. 


(2) Labs unremarkable 








Hospital Course:


Pt was given 5 days SoluMedrol 250 mg IV Q6 as per neurology. MRI thoracic and 

lumbar spine were done in addition to MRI brain and cervical spine. numerous 

plaques noted throughout MRI imaging in brain and spinal cord. Pt initially had 

significant left leg pain and weakness which was worsened upon lying flat, 

though MRI lumbar spine negative for any concerning nerve root impingement or 

cord compression. When pt did not improve after 5 additional days (keeping in 

consideration recent admission last week with 3 days IV steroids) it was 

discussed with neurology and the patient who agreed that transfer to tertiary 

care center was likely best for continued management of this acute exacerbation 

of MS which was not resolved by high dose steroids. 


Minutes to complete discharge: 35





Discharge Summary


Reason For Visit: EXCERBATION OF MULTIPLE SCLEROSIS


Current Active Problems





Elevated blood pressure reading (Acute)


Exacerbation of multiple sclerosis (Acute)


Optic neuritis due to multiple sclerosis (Acute)


Orthostasis (Acute)








Condition: Stable





- Instructions


Diet, Activity, Other Instructions: 


You were admitted for an exacerbation of your MS. You received 5 days of high 

dose steroids and were seen by your neurologist Dr. Bridges. At the end of your 

5 days of treatment, you felt that your symptoms had not adequately improved. 

Dr. Bridges discussed the options and it was decided that you should be 

transferred to [facility] for further treatment. 





All management should be continued.








You should follow up with your neurologist and primary doctor within 1 week 

following discharge from [facility].








Referrals: 


Dago Bridges MD [Staff Physician] - 


Disposition: TRANSFER ACUTE CARE/OTHER HOSP





- Home Medications


Comprehensive Discharge Medication List: 


Ambulatory Orders





Acetaminophen [Tylenol .Regular Strength -] 650 mg PO Q6H PRN  tablet 01/22/19 


Duloxetine HCl [Cymbalta -] 20 mg PO DAILY  capsule. 01/22/19 


Pantoprazole Sodium [Protonix -] 40 mg PO DAILY  tablet.ec 01/22/19 


Polyethylene Glycol 3350 [Miralax 119 gm Btl -] 17 gm PO DAILY  bottle 01/22/19 








This patient is new to me today: No


Emergency Visit: Yes


ED Registration Date: 01/18/19


Care time: The patient presented to the Emergency Department on the above date 

and was hospitalized for further evaluation of their emergent condition.


Critical Care patient: No





- Discharge Referral


Referred to [facility] No

## 2019-01-22 NOTE — EKG
Test Reason : 

Blood Pressure : ***/*** mmHG

Vent. Rate : 076 BPM     Atrial Rate : 076 BPM

   P-R Int : 112 ms          QRS Dur : 088 ms

    QT Int : 360 ms       P-R-T Axes : 047 018 012 degrees

   QTc Int : 405 ms

 

NORMAL SINUS RHYTHM

INCOMPLETE RBBB

NONSPECIFIC ST ABNORMALITY

ABNORMAL ECG

Confirmed by MD EUSEBIO, IRON ([id number]) on 1/22/2019 4:47:08 PM

 

Referred By:             Confirmed By:IRON KAPLAN MD

## 2019-01-22 NOTE — PN
Progress Note, Physician


Chief Complaint: 





Still with blurred vision and weakness


History of Present Illness: 





Patient was seen and examined. Awake and alert. Chart was reviewed


Denies chest pain, SOB or palpitations


Elevated blood pressure intermittently, but now appears better





- Current Medication List


Current Medications: 


Active Medications





Acetaminophen (Tylenol -)  650 mg PO Q6H PRN


   PRN Reason: PAIN LEVEL 1-5


   Last Admin: 01/21/19 17:45 Dose:  650 mg


Acetaminophen/Butalbital/Caffeine (Fioricet -)  1 tablet PO DAILY PRN


   PRN Reason: HEADACHE


   Last Admin: 01/22/19 05:34 Dose:  1 tablet


Duloxetine HCl (Cymbalta -)  20 mg PO DAILY [facility]


   Last Admin: 01/22/19 09:27 Dose:  20 mg


Heparin Sodium (Porcine) (Heparin -)  5,000 unit SQ TID [facility]


   Last Admin: 01/22/19 05:27 Dose:  Not Given


Insulin Aspart (Novolog Vial Sliding Scale -)  1 vial SQ ACHS [facility]; Protocol


   Last Admin: 01/22/19 06:26 Dose:  Not Given


Methylprednisolone Sodium Succinate (Solu-Medrol -)  250 mg IVPB Q6H-IV ZAYNAB


   Last Admin: 01/22/19 09:25 Dose:  250 mg


Ondansetron HCl (Zofran Injection)  4 mg IVPUSH Q8H PRN


   PRN Reason: NAUSEA


   Last Admin: 01/21/19 17:28 Dose:  4 mg


Pantoprazole Sodium (Protonix -)  40 mg PO DAILY [facility]


   Last Admin: 01/22/19 09:25 Dose:  40 mg


Polyethylene Glycol (Miralax (For Daily Use) -)  17 gm PO DAILY [facility]


   Last Admin: 01/22/19 09:27 Dose:  17 gm











- Objective


Vital Signs: 


 Vital Signs











Temperature  97.1 F L  01/22/19 09:00


 


Pulse Rate  84   01/22/19 09:00


 


Respiratory Rate  18   01/22/19 09:00


 


Blood Pressure  129/71   01/22/19 09:00


 


O2 Sat by Pulse Oximetry (%)  97   01/21/19 10:00











Eyes: Yes: PERRL


HENT: Yes: Atraumatic


Neck: Yes: Supple


Cardiovascular: Yes: Regular Rate and Rhythm, S1, S2


Respiratory: Yes: CTA Bilaterally


Gastrointestinal: Yes: Normal Bowel Sounds, Soft.  No: Tenderness


Edema: No


Additional Findings/Remarks: 








- Review of Systems


Constitutional: denies: Chills, Fever


Cardiovascular: reports: Chest Pain.  denies: Palpitations, Shortness of Breath


Respiratory: denies: Cough, Hemoptysis, SOB, SOB on Exertion


Gastrointestinal: denies: Abdominal Pain, Constipation, Diarrhea, Melena, Nausea

, Rectal Bleeding, Vomiting


Musculoskeletal: denies: Joint Pain


Neurological: reports: Headache, Weakness.  denies: Dizziness, Seizure, Syncope











Problem List





- Problems


(1) Elevated blood pressure reading


Code(s): R03.0 - ELEVATED BLOOD-PRESSURE READING, W/O DIAGNOSIS OF HTN   





(2) Orthostasis


Code(s): I95.1 - ORTHOSTATIC HYPOTENSION   





(3) Exacerbation of multiple sclerosis


Code(s): G35 - MULTIPLE SCLEROSIS   





(4) Optic neuritis due to multiple sclerosis


Code(s): H46.9 - UNSPECIFIED OPTIC NEURITIS; G35 - MULTIPLE SCLEROSIS   





Assessment/Plan





1. Fluctuating blood pressure with periods of HTN and with orthostasis


2. Acute MS with optic neuritis and both upper and lower extremity weakness


3. Elevated WBC





PLAN:


1. Avoid excessive fluid boluses


2. Monitor BP closely which can be elevated due to steroids. Monitor for 

orthostasis. If BP remains elevated, may use small dose of calcium channel 

blocker


3. Echocardiography to assess LV/RV and valvular function


4. Plan to transfer to [facility] as per Dr. Bridges





Further plans are to follow


oCllin Marquez MD

## 2019-01-22 NOTE — ECHO
______________________________________________________________________________



Name: ARIADNE HOLDEN                                    Exam:Adult Echocardiogram

MRN: [id number]         Study Date: 01/22/2019 11:07 AM

Age: 33 yrs

______________________________________________________________________________



Reason For Study: ORTHOSTATIC HYPOETNSION DIZZINESS

Height: 63 in        Weight: 132 lb        BSA: 1.6 m2



______________________________________________________________________________



MMode/2D Measurements & Calculations

IVSd: 0.77 cm                                         Ao root diam: 2.4 cm

LVIDd: 3.5 cm                                         LA dimension: 2.9 cm

LVIDs: 2.5 cm

LVPWd: 0.76 cm



_______________________________________________________

EDV(Teich): 50.0 ml

ESV(Teich): 21.7 ml



Doppler Measurements & Calculations

MV E max robert: 58.2 cm/sec                                 PI end-d robert: 74.9 cm/sec

MV A max robert: 55.8 cm/sec

MV E/A: 1.0

MV dec time: 0.22 sec



___________________________________________________________

Med Peak E' Robert: 12.4 cm/sec

Med E/e': 4.7

Lat Peak E' Robert: 13.6 cm/sec

Lat E/e': 4.3





______________________________________________________________________________

Left Ventricle

The left ventricular size, thickness and function are normal. Ejection Fraction = 65. Left Ventricula
r Filling

pattern is normal for age.

Right Ventricle

The right ventricle is normal in size and function.

Atria

Normal left and right atrial size and function.

Mitral Valve

The mitral valve is normal.

Tricuspid Valve

The tricuspid valve is normal. There is trace tricuspid regurgitation.

Aortic Valve

The aortic valve is normal in structure and function.

Pulmonic Valve

The pulmonic valve is not well seen, but is grossly normal.

Great Vessels

The aortic root is normal size. Normal aortic arch, descending and ascending aorta.

Pericardium/Pleura

There is no pericardial effusion.



______________________________________________________________________________



Interpretation Summary

The left ventricular size, thickness and function are normal

Ejection Fraction = 65.

Left Ventricular Filling pattern is normal for age.

The right ventricle is normal in size and function.

Normal left and right atrial size and function.

The mitral valve is normal.

The tricuspid valve is normal.

There is trace tricuspid regurgitation.

The aortic valve is normal in structure and function.

The pulmonic valve is not well seen, but is grossly normal.

The aortic root is normal size.

Normal aortic arch, descending and ascending aorta

There is no pericardial effusion.







Axel Marcial 01/22/2019 02:40 PM

## 2019-01-23 RX ADMIN — METHYLPREDNISOLONE SODIUM SUCCINATE SCH MG: 125 INJECTION, POWDER, FOR SOLUTION INTRAMUSCULAR; INTRAVENOUS at 02:53

## 2019-01-23 RX ADMIN — DULOXETINE SCH MG: 20 CAPSULE, DELAYED RELEASE ORAL at 10:56

## 2019-01-23 RX ADMIN — HEPARIN SODIUM SCH: 5000 INJECTION, SOLUTION INTRAVENOUS; SUBCUTANEOUS at 06:04

## 2019-01-23 RX ADMIN — HEPARIN SODIUM SCH: 5000 INJECTION, SOLUTION INTRAVENOUS; SUBCUTANEOUS at 21:23

## 2019-01-23 RX ADMIN — HEPARIN SODIUM SCH: 5000 INJECTION, SOLUTION INTRAVENOUS; SUBCUTANEOUS at 16:13

## 2019-01-23 RX ADMIN — PANTOPRAZOLE SODIUM SCH MG: 40 TABLET, DELAYED RELEASE ORAL at 10:55

## 2019-01-23 RX ADMIN — INSULIN ASPART SCH: 100 INJECTION, SOLUTION INTRAVENOUS; SUBCUTANEOUS at 06:04

## 2019-01-23 RX ADMIN — POLYETHYLENE GLYCOL 3350 SCH: 17 POWDER, FOR SOLUTION ORAL at 13:19

## 2019-01-23 RX ADMIN — ACETAMINOPHEN PRN MG: 325 TABLET ORAL at 08:46

## 2019-01-23 RX ADMIN — METHYLPREDNISOLONE SODIUM SUCCINATE SCH MG: 125 INJECTION, POWDER, FOR SOLUTION INTRAMUSCULAR; INTRAVENOUS at 08:45

## 2019-01-23 NOTE — PN
Progress Note (short form)





- Note


Progress Note: 











                  Neurology








HISTORY OF PRESENT ILLNESS:


The patient is a 32 yo f w/ PMH MS who comes into the ED c/o a 2 week hx of 

blurred vision, headaches and LLE weakness. The patient sought medical 

attention at [facility] and was admitted there for treatment of these complaints. 

She was treated with 1g of IV solumedrol for three days and discharged home 

with a large amount of PO steroids and instructed to follow up. The patient's 

symptoms had not resolved on discharge and therefore saw me in the office on 1/ 18 and referred her for admission upon hearing that her symptoms persisted. 

Imaging was not done at [facility]. Patient also complains of headaches 2/2 to 

the blurred vision as well as LLE weakness along with radicular pain. Patient 

denied loss of sensation or paralysis. Patient denies slurred speech or facial 

droop. Completed MRI brain with hypertense L pontine lesion. Supratentorial 

white matter plaques as well. On C Spine MRI, C4 plaque as well as smaller C5, 

C6, C7 plauques noted. T spine MRI completed and with T9 and T10 lesions/

plaques noted. MRI L spine reviewed and without plaques. SHe does not feel she 

has improved despite 5 days course of steroids completed tonight. Reached out 

to [facility]  to facilitate transfer to [facility] for advanced 

care. Multiple conversations with transfer center yesterday, patient accepted 

by Dr. Sanchez, but also spoke to Dr. Orellana, MS specialist, who will be 

consulting and managing patient. Possibly for PLEX but will defer to their 

expertisse. Discussed with patient, awaiting bed. 





Allergies





No Known Allergies Allergy (Verified 01/17/19 19:07)


 


Active Medications











Generic Name Dose Route Start Last Admin





  Trade Name Freq  PRN Reason Stop Dose Admin


 


Acetaminophen  650 mg  01/17/19 23:49  01/23/19 08:46





  Tylenol -  PO   650 mg





  Q6H PRN   Administration





  PAIN LEVEL 1-5   





     





     





     


 


Acetaminophen/Butalbital/Caffeine  1 tablet  01/18/19 09:27  01/22/19 05:34





  Fioricet -  PO   1 tablet





  DAILY PRN   Administration





  HEADACHE   





     





     





     


 


Duloxetine HCl  20 mg  01/18/19 10:00  01/22/19 09:27





  Cymbalta -  PO   20 mg





  DAILY ZAYNAB   Administration





     





     





     





     


 


Heparin Sodium (Porcine)  5,000 unit  01/18/19 22:00  01/23/19 06:04





  Heparin -  SQ   Not Given





  TID [facility]   





     





     





     





     


 


Insulin Aspart  1 vial  01/18/19 11:00  01/23/19 06:04





  Novolog Vial Sliding Scale -  SQ   Not Given





  ACHS [facility]   





     





     





  Protocol   





     


 


Methylprednisolone Sodium Succinate  250 mg  01/18/19 21:00  01/23/19 02:53





  Solu-Medrol -  IVPB   250 mg





  Q6H-IV ZAYNAB   Administration





     





     





     





     


 


Ondansetron HCl  4 mg  01/19/19 19:47  01/21/19 17:28





  Zofran Injection  IVPUSH   4 mg





  Q8H PRN   Administration





  NAUSEA   





     





     





     


 


Pantoprazole Sodium  40 mg  01/19/19 10:00  01/22/19 09:25





  Protonix -  PO   40 mg





  DAILY ZAYNAB   Administration





     





     





     





     


 


Polyethylene Glycol  17 gm  01/21/19 14:15  01/22/19 09:27





  Miralax (For Daily Use) -  PO   17 gm





  DAILY ZAYNAB   Administration





     





     





     





     














PHYSICAL EXAMINATION


  


  Vital Signs











 Period  Temp  Pulse  Resp  BP Sys/James  Pulse Ox


 


 Last 24 Hr  97.8 F-99 F    20-20  125-163/  

















GENERAL: Awake, alert, and fully oriented, in no acute distress.


HEAD: Normal with no signs of trauma.


EYES: Pupils equal, round. Left pupil sluggish in response to light. Right 

pupil with brisk response to light. Consensual pupillary reaction intact.  

extraocular movements intact. Patient endorses left eye pain upon asked to look 

up and to the right. sclera anicteric, conjunctiva clear. No lid lag.


LUNGS: Breath sounds equal, clear to auscultation bilaterally. No wheezes, and 

no crackles. No accessory muscle use.


HEART: Regular rate and rhythm, normal S1 and S2 without murmur, rub or gallop.


ABDOMEN: Soft, nontender, not distended, normoactive bowel sounds, no guarding, 

no rebound, no masses.  No hepatomegaly or  splenomegaly. 


LOWER EXTREMITIES: 2+ pulses, warm, well-perfused. No calf tenderness. No 

peripheral edema. 


NEUROLOGICAL:  Cranial nerves II-X intact except for eyes noted above. Normal 

speech. Strength 5/5 in both upper extremities, 5/5 in the right lower extremity

, 5-/5 in left lower extremity. 


SKIN: Warm, dry, normal turgor, no rashes or lesions noted, normal capillary 

refill. 





  CBCD











WBC  13.6 K/mm3 (4.0-10.0)  H  01/19/19  06:35    


 


RBC  4.20 M/mm3 (3.60-5.2)   01/19/19  06:35    


 


Hgb  13.2 GM/dL (10.7-15.3)   01/19/19  06:35    


 


Hct  38.6 % (32.4-45.2)   01/19/19  06:35    


 


MCV  91.8 fl (80-96)   01/19/19  06:35    


 


MCHC  34.2 g/dl (32.0-36.0)   01/19/19  06:35    


 


RDW  12.4 % (11.6-15.6)   01/19/19  06:35    


 


Plt Count  261 K/MM3 (134-434)  D 01/19/19  06:35    


 


MPV  9.6 fl (7.5-11.1)   01/19/19  06:35    








 CMP











Sodium  135 mmol/L (136-145)  L  01/19/19  06:35    


 


Potassium  4.3 mmol/L (3.5-5.1)   01/19/19  06:35    


 


Chloride  100 mmol/L ()   01/19/19  06:35    


 


Carbon Dioxide  28 mmol/L (21-32)   01/19/19  06:35    


 


Anion Gap  7 MMOL/L (8-16)  L  01/19/19  06:35    


 


BUN  18 mg/dL (7-18)   01/19/19  06:35    


 


Creatinine  0.8 mg/dL (0.55-1.3)   01/19/19  06:35    


 


Creat Clearance w eGFR  > 60  (>60)   01/19/19  06:35    


 


Calcium  8.3 mg/dL (8.5-10.1)  L  01/19/19  06:35    


 


Total Bilirubin  0.4 mg/dL (0.2-1)   01/17/19  18:40    


 


AST  41 U/L (15-37)  H  01/17/19  18:40    


 


ALT  33 U/L (13-61)   01/17/19  18:40    


 


Alkaline Phosphatase  49 U/L ()   01/17/19  18:40    


 


Total Protein  7.5 g/dl (6.4-8.2)   01/17/19  18:40    


 


Albumin  3.5 g/dl (3.4-5.0)   01/17/19  18:40    














MRI Brain reviewed


MRI C spine reviewed


MRI T spine reviewed


MRI L spine reviewed





ASSESSMENT/PLAN:


 32 yo f w/ PMH MS who comes into the ED c/o a 2 week hx of blurred vision, 

headaches and LLE weakness. The patient sought medical attention at [facility] 

and was admitted there for treatment of these complaints. She was treated with 

1g of IV solumedrol for three days and discharged home with a large amount of 

PO steroids and instructed to follow up. The patient's symptoms had not 

resolved on discharge and therefore saw me in the office on 1/18 and referred 

her for admission upon hearing that her symptoms persisted. Imaging was not 

done at [facility]. Patient also complains of headaches 2/2 to the blurred vision 

as well as LLE weakness along with radicular pain. Patient denied loss of 

sensation or paralysis. Patient denies slurred speech or facial droop. 

Completed MRI brain with hypertense L pontine lesion. Supratentorial white 

matter plaques as well. On C Spine MRI, C4 plaque as well as smaller C5, C6, C7 

plauques noted. T spine MRI completed and with T9 and T10 lesions/plaques 

noted. MRI L spine reviewed and without plaques. SHe does not feel she has 

improved despite 5 days course of steroids to be completed tonight. Reached out 

to [facility]  to facilitate transfer to [facility] for advanced 

care. Patient provided multiple options but prefers transfer. Discussed with 

nurse and hospitalist, both aware. Remains on duloxetine 20mg for symptoms of 

lumbar radiculopathy for now. Fioricet for tension headaches. Steroids completed

, plan for transfer.

## 2019-01-23 NOTE — PN
Teaching Attending Note


Name of Resident: Zaki Camacho





ATTENDING PHYSICIAN STATEMENT





I saw and evaluated the patient.


I reviewed the resident's note and discussed the case with the resident.


I agree with the resident's findings and plan as documented.








SUBJECTIVE: Patient continues to complain of weakness and pain in both legs. 








OBJECTIVE:


 Vital Signs











 Period  Temp  Pulse  Resp  BP Sys/James  Pulse Ox


 


 Last 24 Hr  97.8 F-99 F    20-20  125-163/  








HEART: S1S2, RRR


LUNGS: Clear


ABDOMEN: Soft, non-tender, non-distended, normal BS


EXTREMITIES: No edema


NEUROLOGICAL: Alert, oriented, left pupil sluggish, visual fields intact, 

strength intact, sensation intact





 


 Laboratory Results - last 24 hr











  01/22/19 01/23/19 01/23/19





  21:32 06:01 12:01


 


POC Glucometer  120  121  140








 Current Medications











Generic Name Dose Route Start Last Admin





  Trade Name Freq  PRN Reason Stop Dose Admin


 


Acetaminophen  650 mg  01/17/19 23:49  01/23/19 08:46





  Tylenol -  PO   650 mg





  Q6H PRN   Administration





  PAIN LEVEL 1-5   





     





     





     


 


Duloxetine HCl  20 mg  01/18/19 10:00  01/23/19 10:56





  Cymbalta -  PO   20 mg





  DAILY ZAYNAB   Administration





     





     





     





     


 


Heparin Sodium (Porcine)  5,000 unit  01/18/19 22:00  01/23/19 06:04





  Heparin -  SQ   Not Given





  TID ZAYNAB   





     





     





     





     


 


Ondansetron HCl  4 mg  01/19/19 19:47  01/21/19 17:28





  Zofran Injection  IVPUSH   4 mg





  Q8H PRN   Administration





  NAUSEA   





     





     





     


 


Oxycodone HCl  5 mg  01/23/19 11:07  





  Roxicodone -  PO   





  Q6H PRN   





  PAIN LEVEL 6-10   





     





     





     


 


Pantoprazole Sodium  40 mg  01/19/19 10:00  01/23/19 10:55





  Protonix -  PO   40 mg





  DAILY ZAYNAB   Administration





     





     





     





     


 


Polyethylene Glycol  17 gm  01/21/19 14:15  01/23/19 13:19





  Miralax (For Daily Use) -  PO   Not Given





  DAILY ZAYNAB   





     





     





     





     














ASSESSMENT AND PLAN:


This is a 33 year old woman with a history of MS who presented to the ED with 

blurred vision, headaches, and left leg pain and weakness. 





1. Acute exacerbation of multiple sclerosis with optic neuritis and LLE pain/

weakness


   - Completed course of SoluMedrol with no improvement


   - Accepted for transfer to [place] - awaiting bed


2. Tension headache


   - Continue Cymbalta


3. Leukocytosis


   - Steroid-induced


4. Orthostasis


   - Echo shows normal LV, LVEF 65%, normal RV, trace TR

## 2019-01-23 NOTE — PN
Progress Note, Physician


History of Present Illness: 


Awaiting transfer to [facility] for MS treatment, reports episodes of positional 

dizziness.





- Current Medication List


Current Medications: 


Active Medications





Acetaminophen (Tylenol -)  650 mg PO Q6H PRN


   PRN Reason: PAIN LEVEL 1-5


   Last Admin: 01/23/19 08:46 Dose:  650 mg


Duloxetine HCl (Cymbalta -)  20 mg PO DAILY [facility]


   Last Admin: 01/23/19 10:56 Dose:  20 mg


Heparin Sodium (Porcine) (Heparin -)  5,000 unit SQ TID [facility]


   Last Admin: 01/23/19 06:04 Dose:  Not Given


Ondansetron HCl (Zofran Injection)  4 mg IVPUSH Q8H PRN


   PRN Reason: NAUSEA


   Last Admin: 01/21/19 17:28 Dose:  4 mg


Oxycodone HCl (Roxicodone -)  5 mg PO Q6H PRN


   PRN Reason: PAIN LEVEL 6-10


Pantoprazole Sodium (Protonix -)  40 mg PO DAILY [facility]


   Last Admin: 01/23/19 10:55 Dose:  40 mg


Polyethylene Glycol (Miralax (For Daily Use) -)  17 gm PO DAILY [facility]


   Last Admin: 01/23/19 13:19 Dose:  Not Given











- Objective


Vital Signs: 


 Vital Signs











Temperature  98.3 F   01/23/19 09:57


 


Pulse Rate  77   01/23/19 09:57


 


Respiratory Rate  20   01/23/19 09:57


 


Blood Pressure  153/87   01/23/19 09:57


 


O2 Sat by Pulse Oximetry (%)  97   01/21/19 10:00











Constitutional: Yes: No Distress, Calm


Neck: Yes: Supple


Cardiovascular: Yes: Regular Rate and Rhythm


Respiratory: Yes: Regular, CTA Bilaterally


Gastrointestinal: Yes: Normal Bowel Sounds, Soft


Edema: No


Labs: 


 CBC, BMP





 01/19/19 06:35 





 01/19/19 06:35 





 INR, PTT











INR  0.98  (0.83-1.09)   01/18/19  05:20    














Problem List





- Problems


(1) Labile hypertension


Code(s): R09.89 - OTH SYMPTOMS AND SIGNS INVOLVING THE CIRC AND RESP SYSTEMS   





(2) Exacerbation of multiple sclerosis


Code(s): G35 - MULTIPLE SCLEROSIS   





(3) Optic neuritis due to multiple sclerosis


Code(s): H46.9 - UNSPECIFIED OPTIC NEURITIS; G35 - MULTIPLE SCLEROSIS   





(4) Orthostasis


Code(s): I95.1 - ORTHOSTATIC HYPOTENSION   





Assessment/Plan


01/22/2019 Echo: Normal RV and LV size and fxn without sig valve abnl





1. Labile blood pressure with periods of HTN and with orthostasis


2. Acute MS with optic neuritis and both upper and lower extremity weakness


3. Elevated WBC





PLAN:


1. Avoid excessive fluid boluses


2. Monitor for orthostasis. If BP remains elevated, may use small dose of 

calcium channel blocker with caution


3. Reviewed Echocardiography to assess LV/RV and valvular function with patient


4. Plan to transfer to [facility] as per Dr. Bridges

## 2019-01-23 NOTE — PN
Physical Exam: 


SUBJECTIVE: Patient seen and examined this AM. She states she is having some 

constipation and would like an enema as she does not want the miralax. States 

that the fioricet is not helping her eye pain however the one time doses of 

oxycodone have provided relief.








OBJECTIVE:





 Vital Signs











 Period  Temp  Pulse  Resp  BP Sys/James  Pulse Ox


 


 Last 24 Hr  97.8 F-98.4 F  74-83  18-20  143-163/  97











GENERAL: A&O, no acute distress


EYES: No visual field loss, though blurred vision in the left eye. Left 

pupillary afferent defect


EARS, NOSE, THROAT: oropharynx clear without exudates. Moist mucous membranes.


LUNGS: CTA b/l, no crackles or wheezes


HEART: Regular rate and rhythm, normal S1 and S2 without murmur


ABDOMEN: Soft, nontender to palpation, normoactive bowel sounds


EXTREMITIES: warm, well-perfused. No peripheral edema. 


NEUROLOGICAL:  Cranial nerves II-XII intact. Normal speech. 5/5 strength now in 

LLE, though pt endorses weakness


PSYCHIATRIC: Cooperative. Good eye contact. Appropriate mood and affect.














 Laboratory Results - last 24 hr











  01/22/19 01/23/19 01/23/19





  21:32 06:01 12:01


 


POC Glucometer  120  121  140








Active Medications











Generic Name Dose Route Start Last Admin





  Trade Name Freq  PRN Reason Stop Dose Admin


 


Acetaminophen  650 mg  01/17/19 23:49  01/23/19 08:46





  Tylenol -  PO   650 mg





  Q6H PRN   Administration





  PAIN LEVEL 1-5   





     





     





     


 


Duloxetine HCl  20 mg  01/18/19 10:00  01/23/19 10:56





  Cymbalta -  PO   20 mg





  DAILY ZAYNAB   Administration





     





     





     





     


 


Heparin Sodium (Porcine)  5,000 unit  01/18/19 22:00  01/23/19 16:13





  Heparin -  SQ   Not Given





  TID ZAYNAB   





     





     





     





     


 


Ondansetron HCl  4 mg  01/19/19 19:47  01/21/19 17:28





  Zofran Injection  IVPUSH   4 mg





  Q8H PRN   Administration





  NAUSEA   





     





     





     


 


Oxycodone HCl  5 mg  01/23/19 11:07  01/23/19 16:37





  Roxicodone -  PO   5 mg





  Q6H PRN   Administration





  PAIN LEVEL 6-10   





     





     





     


 


Pantoprazole Sodium  40 mg  01/19/19 10:00  01/23/19 10:55





  Protonix -  PO   40 mg





  DAILY ZAYNAB   Administration





     





     





     





     


 


Polyethylene Glycol  17 gm  01/21/19 14:15  01/23/19 13:19





  Miralax (For Daily Use) -  PO   Not Given





  DAILY ZAYNAB   





     





     





     





     











ASSESSMENT/PLAN:


34 yo f w/ PMH MS who comes into the ED c/o a 2 week hx of blurred vision, 

headaches and LLE weakness.








Acute Exacerbation of Multiple Sclerosis with Optic Neuritis


-Neurology consult appreciated and case discussed


-Received 5 days of high dose Solumedrol without much improvement


-oxycodone 5 mg PO Q6 for 7-10 pain


-Options discussed with pt as per neurology, at this time likely best to 

transfer to [facility] for further management. 


-Transfer pending bed availability





Back and left leg pain


-MRI lumbar spine without concerning compression


-Cymbalta 20 mg PO Daily, can increase to 30 as per neurology if ineffective





DVT Prophylaxis


-Lovenox 40 mg SQ Daily





FEN


-Fluids: none


-Electrolytes: No electrolyte abnormalities, BMP in AM


-Nutrition: Regular Diet





Disposition


Med/Surg








Visit type





- Emergency Visit


Emergency Visit: Yes


ED Registration Date: 01/18/19


Care time: The patient presented to the Emergency Department on the above date 

and was hospitalized for further evaluation of their emergent condition.





- New Patient


This patient is new to me today: No





- Critical Care


Critical Care patient: No

## 2019-01-24 VITALS — DIASTOLIC BLOOD PRESSURE: 59 MMHG | HEART RATE: 84 BPM | TEMPERATURE: 98.3 F | SYSTOLIC BLOOD PRESSURE: 123 MMHG

## 2019-01-24 RX ADMIN — POLYETHYLENE GLYCOL 3350 SCH GM: 17 POWDER, FOR SOLUTION ORAL at 10:03

## 2019-01-24 RX ADMIN — HEPARIN SODIUM SCH: 5000 INJECTION, SOLUTION INTRAVENOUS; SUBCUTANEOUS at 14:43

## 2019-01-24 RX ADMIN — HEPARIN SODIUM SCH: 5000 INJECTION, SOLUTION INTRAVENOUS; SUBCUTANEOUS at 05:34

## 2019-01-24 RX ADMIN — PANTOPRAZOLE SODIUM SCH MG: 40 TABLET, DELAYED RELEASE ORAL at 10:02

## 2019-01-24 NOTE — PN
Teaching Attending Note


Name of Resident: Zaki Camacho





ATTENDING PHYSICIAN STATEMENT





I saw and evaluated the patient.


I reviewed the resident's note and discussed the case with the resident.


I agree with the resident's findings and plan as documented.








SUBJECTIVE: Patient continues to have pain and weakness in both legs, pain in 

her left eye








OBJECTIVE:


 Vital Signs











 Period  Temp  Pulse  Resp  BP Sys/James  Pulse Ox


 


 Last 24 Hr  97.8 F-98.7 F  71-89  18-20  135-182/  97








HEART: S1S2, RRR


LUNGS: Clear


ABDOMEN: Soft, non-tender, non-distended, normal BS


EXTREMITIES: No edema


NEUROLOGICAL: Alert, oriented, left pupil sluggish, visual fields intact, 

strength intact, sensation intact








 Laboratory Results - last 24 hr











  01/23/19 01/24/19





  17:32 12:23


 


POC Glucometer  126  89








 Current Medications











Generic Name Dose Route Start Last Admin





  Trade Name Freq  PRN Reason Stop Dose Admin


 


Acetaminophen  650 mg  01/17/19 23:49  01/23/19 08:46





  Tylenol -  PO   650 mg





  Q6H PRN   Administration





  PAIN LEVEL 1-5   





     





     





     


 


Duloxetine HCl  20 mg  01/24/19 10:00  01/24/19 10:02





  Cymbalta -  PO   20 mg





  BID ZAYNAB   Administration





     





     





     





     


 


Heparin Sodium (Porcine)  5,000 unit  01/18/19 22:00  01/24/19 05:34





  Heparin -  SQ   Not Given





  TID ZAYNAB   





     





     





     





     


 


Ondansetron HCl  4 mg  01/19/19 19:47  01/21/19 17:28





  Zofran Injection  IVPUSH   4 mg





  Q8H PRN   Administration





  NAUSEA   





     





     





     


 


Oxycodone HCl  5 mg  01/23/19 11:07  01/24/19 04:45





  Roxicodone -  PO   5 mg





  Q6H PRN   Administration





  PAIN LEVEL 6-10   





     





     





     


 


Pantoprazole Sodium  40 mg  01/19/19 10:00  01/24/19 10:02





  Protonix -  PO   40 mg





  DAILY ZAYNAB   Administration





     





     





     





     


 


Polyethylene Glycol  17 gm  01/21/19 14:15  01/24/19 10:03





  Miralax (For Daily Use) -  PO   17 gm





  DAILY ZAYNAB   Administration





     





     





     





     














ASSESSMENT AND PLAN:


This is a 33 year old woman with a history of MS who presented to the ED with 

blurred vision, headaches, and left leg pain and weakness. 





1. Acute exacerbation of multiple sclerosis with optic neuritis and LLE pain/

weakness


   - Completed course of SoluMedrol with no improvement


   - Accepted for transfer to [place] - awaiting bed


2. Tension headache


   - Continue Cymbalta


3. Leukocytosis


   - Steroid-induced


4. Orthostasis


   - Echo shows normal LV, LVEF 65%, normal RV, trace TR

## 2019-01-24 NOTE — PN
Physical Exam: 


SUBJECTIVE: Patient seen and examined this AM. She states she is still having 

some constipation. Also notes new raised and red bumps on her chest which she 

has never had before.








OBJECTIVE:





 Vital Signs











 Period  Temp  Pulse  Resp  BP Sys/James  Pulse Ox


 


 Last 24 Hr  97.8 F-98.7 F  71-89  18-20  135-182/  97











GENERAL: A&O, no acute distress


EYES: No visual field loss, though blurred vision in the left eye. Left 

pupillary afferent defect


EARS, NOSE, THROAT: oropharynx clear without exudates. Moist mucous membranes.


CHEST: numerous small closed comedones on her chest 


LUNGS: CTA b/l, no crackles or wheezes


HEART: Regular rate and rhythm, normal S1 and S2 without murmur


ABDOMEN: Soft, nontender to palpation, normoactive bowel sounds


EXTREMITIES: warm, well-perfused. No peripheral edema. 


NEUROLOGICAL:  Cranial nerves II-XII intact. Normal speech. 5/5 strength now in 

LLE, though pt endorses weakness


PSYCHIATRIC: Cooperative. Good eye contact. Appropriate mood and affect.














 Laboratory Results - last 24 hr











  01/23/19 01/24/19





  17:32 12:23


 


POC Glucometer  126  89








Active Medications











Generic Name Dose Route Start Last Admin





  Trade Name Freq  PRN Reason Stop Dose Admin


 


Acetaminophen  650 mg  01/17/19 23:49  01/23/19 08:46





  Tylenol -  PO   650 mg





  Q6H PRN   Administration





  PAIN LEVEL 1-5   





     





     





     


 


Duloxetine HCl  20 mg  01/24/19 10:00  01/24/19 10:02





  Cymbalta -  PO   20 mg





  BID ZAYNAB   Administration





     





     





     





     


 


Heparin Sodium (Porcine)  5,000 unit  01/18/19 22:00  01/24/19 14:43





  Heparin -  SQ   Not Given





  TID ZAYNAB   





     





     





     





     


 


Ondansetron HCl  4 mg  01/19/19 19:47  01/21/19 17:28





  Zofran Injection  IVPUSH   4 mg





  Q8H PRN   Administration





  NAUSEA   





     





     





     


 


Oxycodone HCl  5 mg  01/23/19 11:07  01/24/19 04:45





  Roxicodone -  PO   5 mg





  Q6H PRN   Administration





  PAIN LEVEL 6-10   





     





     





     


 


Pantoprazole Sodium  40 mg  01/19/19 10:00  01/24/19 10:02





  Protonix -  PO   40 mg





  DAILY ZAYNAB   Administration





     





     





     





     


 


Polyethylene Glycol  17 gm  01/21/19 14:15  01/24/19 10:03





  Miralax (For Daily Use) -  PO   17 gm





  DAILY ZAYNAB   Administration





     





     





     





     











ASSESSMENT/PLAN:


32 yo f w/ PMH MS who comes into the ED c/o a 2 week hx of blurred vision, 

headaches and LLE weakness.








Acute Exacerbation of Multiple Sclerosis with Optic Neuritis


-Neurology consult appreciated and case discussed


-Received 5 days of high dose Solumedrol without much improvement


-oxycodone 5 mg PO Q6 for 7-10 pain


-Options discussed with pt as per neurology, at this time likely best to 

transfer to [facility] for further management. 


-Transfer pending bed availability





Likely Acne on her chest


-will trial Benzoyl peroxide for now





Back and left leg pain


-MRI lumbar spine without concerning compression


-Cymbalta 20 mg PO Daily, can increase to 30 as per neurology if ineffective





DVT Prophylaxis


-Lovenox 40 mg SQ Daily





FEN


-Fluids: none


-Electrolytes: No electrolyte abnormalities, BMP in AM


-Nutrition: Regular Diet





Disposition


Med/Surg








Visit type





- Emergency Visit


Emergency Visit: Yes


ED Registration Date: 01/18/19


Care time: The patient presented to the Emergency Department on the above date 

and was hospitalized for further evaluation of their emergent condition.





- New Patient


This patient is new to me today: No





- Critical Care


Critical Care patient: No

## 2019-01-24 NOTE — PN
Progress Note (short form)





- Note


Progress Note: 











                  Neurology








HISTORY OF PRESENT ILLNESS:


The patient is a 32 yo f w/ PMH MS who comes into the ED c/o a 2 week hx of 

blurred vision, headaches and LLE weakness. The patient sought medical 

attention at [place] and was admitted there for treatment of these complaints. 

She was treated with 1g of IV solumedrol for three days and discharged home 

with a large amount of PO steroids and instructed to follow up. The patient's 

symptoms had not resolved on discharge and therefore saw me in the office on 1/ 18 and referred her for admission upon hearing that her symptoms persisted. 

Imaging was not done at [place]. Patient also complains of headaches 2/2 to 

the blurred vision as well as LLE weakness along with radicular pain. Patient 

denied loss of sensation or paralysis. Patient denies slurred speech or facial 

droop. Completed MRI brain with hypertense L pontine lesion. Supratentorial 

white matter plaques as well. On C Spine MRI, C4 plaque as well as smaller C5, 

C6, C7 plauques noted. T spine MRI completed and with T9 and T10 lesions/

plaques noted. MRI L spine reviewed and without plaques. SHe does not feel she 

has improved despite 5 days course of steroids completed tonight. Reached out 

to [place]  to facilitate transfer to [place] for advanced 

care. Multiple conversations with transfer center on 1/22, patient accepted by 

Dr. Sanchez, but also spoke to Dr. Orellana, MS specialist, who will be consulting 

and managing patient. Possibly for PLEX but will defer to their expertise. 

Discussed with patient, still awaiting bed. Reached out this AM and limited bed 

availability but patient is on expedited list. Patient aware and understands 

situation. Is willing to wait for bed and looks forward to treatment. Steroids d

/jose yesterday. Cymbalta 20mg has not been effective, will increase to twice 

daily. 





Allergies





No Known Allergies Allergy (Verified 01/17/19 19:07)


 


Active Medications





Acetaminophen (Tylenol -)  650 mg PO Q6H PRN


   PRN Reason: PAIN LEVEL 1-5


   Last Admin: 01/23/19 08:46 Dose:  650 mg


Duloxetine HCl (Cymbalta -)  20 mg PO DAILY [facility]


   Last Admin: 01/23/19 10:56 Dose:  20 mg


Heparin Sodium (Porcine) (Heparin -)  5,000 unit SQ TID [facility]


   Last Admin: 01/24/19 05:34 Dose:  Not Given


Ondansetron HCl (Zofran Injection)  4 mg IVPUSH Q8H PRN


   PRN Reason: NAUSEA


   Last Admin: 01/21/19 17:28 Dose:  4 mg


Oxycodone HCl (Roxicodone -)  5 mg PO Q6H PRN


   PRN Reason: PAIN LEVEL 6-10


   Last Admin: 01/24/19 04:45 Dose:  5 mg


Pantoprazole Sodium (Protonix -)  40 mg PO DAILY [facility]


   Last Admin: 01/23/19 10:55 Dose:  40 mg


Polyethylene Glycol (Miralax (For Daily Use) -)  17 gm PO DAILY [facility]


   Last Admin: 01/23/19 13:19 Dose:  Not Given














PHYSICAL EXAMINATION


  


  


 Vital Signs











Temperature  97.8 F   01/24/19 07:00


 


Pulse Rate  71   01/24/19 07:00


 


Respiratory Rate  18   01/24/19 07:00


 


Blood Pressure  153/91   01/24/19 07:00


 


O2 Sat by Pulse Oximetry (%)  97   01/23/19 21:00

















GENERAL: Awake, alert, and fully oriented, in no acute distress.


HEAD: Normal with no signs of trauma.


EYES: Pupils equal, round. Left pupil sluggish in response to light. Right 

pupil with brisk response to light. Consensual pupillary reaction intact.  

extraocular movements intact. Patient endorses left eye pain upon asked to look 

up and to the right. sclera anicteric, conjunctiva clear. No lid lag.


LUNGS: Breath sounds equal, clear to auscultation bilaterally. No wheezes, and 

no crackles. No accessory muscle use.


HEART: Regular rate and rhythm, normal S1 and S2 without murmur, rub or gallop.


ABDOMEN: Soft, nontender, not distended, normoactive bowel sounds, no guarding, 

no rebound, no masses.  No hepatomegaly or  splenomegaly. 


LOWER EXTREMITIES: 2+ pulses, warm, well-perfused. No calf tenderness. No 

peripheral edema. 


NEUROLOGICAL:  Cranial nerves II-X intact except for eyes noted above. Normal 

speech. Strength 5/5 in both upper extremities, 5/5 in the right lower extremity

, 5-/5 in left lower extremity. 


SKIN: Warm, dry, normal turgor, no rashes or lesions noted, normal capillary 

refill. 





  CBCD


 CBCD











WBC  13.6 K/mm3 (4.0-10.0)  H  01/19/19  06:35    


 


RBC  4.20 M/mm3 (3.60-5.2)   01/19/19  06:35    


 


Hgb  13.2 GM/dL (10.7-15.3)   01/19/19  06:35    


 


Hct  38.6 % (32.4-45.2)   01/19/19  06:35    


 


MCV  91.8 fl (80-96)   01/19/19  06:35    


 


MCHC  34.2 g/dl (32.0-36.0)   01/19/19  06:35    


 


RDW  12.4 % (11.6-15.6)   01/19/19  06:35    


 


Plt Count  261 K/MM3 (134-434)  D 01/19/19  06:35    


 


MPV  9.6 fl (7.5-11.1)   01/19/19  06:35    








 CMP











Sodium  135 mmol/L (136-145)  L  01/19/19  06:35    


 


Potassium  4.3 mmol/L (3.5-5.1)   01/19/19  06:35    


 


Chloride  100 mmol/L ()   01/19/19  06:35    


 


Carbon Dioxide  28 mmol/L (21-32)   01/19/19  06:35    


 


Anion Gap  7 MMOL/L (8-16)  L  01/19/19  06:35    


 


BUN  18 mg/dL (7-18)   01/19/19  06:35    


 


Creatinine  0.8 mg/dL (0.55-1.3)   01/19/19  06:35    


 


Creat Clearance w eGFR  > 60  (>60)   01/19/19  06:35    


 


Calcium  8.3 mg/dL (8.5-10.1)  L  01/19/19  06:35    


 


Total Bilirubin  0.4 mg/dL (0.2-1)   01/17/19  18:40    


 


AST  41 U/L (15-37)  H  01/17/19  18:40    


 


ALT  33 U/L (13-61)   01/17/19  18:40    


 


Alkaline Phosphatase  49 U/L ()   01/17/19  18:40    


 


Total Protein  7.5 g/dl (6.4-8.2)   01/17/19  18:40    


 


Albumin  3.5 g/dl (3.4-5.0)   01/17/19  18:40    

















MRI Brain reviewed


MRI C spine reviewed


MRI T spine reviewed


MRI L spine reviewed





ASSESSMENT/PLAN:


 32 yo f w/ PMH MS who comes into the ED c/o a 2 week hx of blurred vision, 

headaches and LLE weakness. The patient sought medical attention at [place] 

and was admitted there for treatment of these complaints. She was treated with 

1g of IV solumedrol for three days and discharged home with a large amount of 

PO steroids and instructed to follow up. The patient's symptoms had not 

resolved on discharge and therefore saw me in the office on 1/18 and referred 

her for admission upon hearing that her symptoms persisted. Imaging was not 

done at [place]. Patient also complains of headaches 2/2 to the blurred vision 

as well as LLE weakness along with radicular pain. Patient denied loss of 

sensation or paralysis. Patient denies slurred speech or facial droop. 

Completed MRI brain with hypertense L pontine lesion. Supratentorial white 

matter plaques as well. On C Spine MRI, C4 plaque as well as smaller C5, C6, C7 

plauques noted. T spine MRI completed and with T9 and T10 lesions/plaques 

noted. MRI L spine reviewed and without plaques. SHe does not feel she has 

improved despite 5 days course of steroids to be completed tonight. Reached out 

to [place]  to facilitate transfer to [place] for advanced 

care. Patient provided multiple options but prefers transfer. Discussed with 

nurse and hospitalist, both aware. Will increase duloxetine 20mg to twice daily 

for symptoms of lumbar radiculopathy for now. Fioricet for tension headaches. 

Steroids discontinued, plan for transfer, awaiting bed at [place].